# Patient Record
Sex: MALE | Race: WHITE | URBAN - METROPOLITAN AREA
[De-identification: names, ages, dates, MRNs, and addresses within clinical notes are randomized per-mention and may not be internally consistent; named-entity substitution may affect disease eponyms.]

---

## 2017-03-08 ENCOUNTER — OFFICE VISIT (OUTPATIENT)
Dept: PEDIATRICS | Facility: CLINIC | Age: 47
End: 2017-03-08
Payer: COMMERCIAL

## 2017-03-08 ENCOUNTER — RADIANT APPOINTMENT (OUTPATIENT)
Dept: CT IMAGING | Facility: CLINIC | Age: 47
End: 2017-03-08
Attending: INTERNAL MEDICINE
Payer: COMMERCIAL

## 2017-03-08 VITALS
HEART RATE: 83 BPM | OXYGEN SATURATION: 98 % | TEMPERATURE: 96.3 F | BODY MASS INDEX: 32.23 KG/M2 | WEIGHT: 246 LBS | SYSTOLIC BLOOD PRESSURE: 146 MMHG | DIASTOLIC BLOOD PRESSURE: 100 MMHG

## 2017-03-08 DIAGNOSIS — R31.29 MICROSCOPIC HEMATURIA: ICD-10-CM

## 2017-03-08 DIAGNOSIS — R10.11 ABDOMINAL PAIN, RIGHT UPPER QUADRANT: Primary | ICD-10-CM

## 2017-03-08 DIAGNOSIS — K76.0 HEPATIC STEATOSIS: ICD-10-CM

## 2017-03-08 DIAGNOSIS — R19.7 DIARRHEA, UNSPECIFIED TYPE: ICD-10-CM

## 2017-03-08 DIAGNOSIS — R94.5 ABNORMAL RESULTS OF LIVER FUNCTION STUDIES: ICD-10-CM

## 2017-03-08 DIAGNOSIS — R10.11 ABDOMINAL PAIN, RIGHT UPPER QUADRANT: ICD-10-CM

## 2017-03-08 DIAGNOSIS — R03.0 ELEVATED BLOOD-PRESSURE READING WITHOUT DIAGNOSIS OF HYPERTENSION: ICD-10-CM

## 2017-03-08 LAB
ALBUMIN SERPL-MCNC: 3.5 G/DL (ref 3.4–5)
ALBUMIN UR-MCNC: NEGATIVE MG/DL
ALP SERPL-CCNC: 91 U/L (ref 40–150)
ALT SERPL W P-5'-P-CCNC: 74 U/L (ref 0–70)
ANION GAP SERPL CALCULATED.3IONS-SCNC: 6 MMOL/L (ref 3–14)
APPEARANCE UR: CLEAR
AST SERPL W P-5'-P-CCNC: 64 U/L (ref 0–45)
BASOPHILS # BLD AUTO: 0 10E9/L (ref 0–0.2)
BASOPHILS NFR BLD AUTO: 0.6 %
BILIRUB SERPL-MCNC: 0.7 MG/DL (ref 0.2–1.3)
BILIRUB UR QL STRIP: NEGATIVE
BUN SERPL-MCNC: 12 MG/DL (ref 7–30)
CALCIUM SERPL-MCNC: 8.4 MG/DL (ref 8.5–10.1)
CHLORIDE SERPL-SCNC: 104 MMOL/L (ref 94–109)
CO2 SERPL-SCNC: 26 MMOL/L (ref 20–32)
COLOR UR AUTO: YELLOW
CREAT SERPL-MCNC: 0.82 MG/DL (ref 0.66–1.25)
DIFFERENTIAL METHOD BLD: NORMAL
EOSINOPHIL # BLD AUTO: 0.1 10E9/L (ref 0–0.7)
EOSINOPHIL NFR BLD AUTO: 2.9 %
ERYTHROCYTE [DISTWIDTH] IN BLOOD BY AUTOMATED COUNT: 13.4 % (ref 10–15)
GFR SERPL CREATININE-BSD FRML MDRD: ABNORMAL ML/MIN/1.7M2
GLUCOSE SERPL-MCNC: 98 MG/DL (ref 70–99)
GLUCOSE UR STRIP-MCNC: NEGATIVE MG/DL
HCT VFR BLD AUTO: 42.5 % (ref 40–53)
HGB BLD-MCNC: 14.3 G/DL (ref 13.3–17.7)
HGB UR QL STRIP: ABNORMAL
KETONES UR STRIP-MCNC: NEGATIVE MG/DL
LEUKOCYTE ESTERASE UR QL STRIP: NEGATIVE
LYMPHOCYTES # BLD AUTO: 1.6 10E9/L (ref 0.8–5.3)
LYMPHOCYTES NFR BLD AUTO: 33.5 %
MCH RBC QN AUTO: 31 PG (ref 26.5–33)
MCHC RBC AUTO-ENTMCNC: 33.6 G/DL (ref 31.5–36.5)
MCV RBC AUTO: 92 FL (ref 78–100)
MONOCYTES # BLD AUTO: 0.5 10E9/L (ref 0–1.3)
MONOCYTES NFR BLD AUTO: 10.7 %
NEUTROPHILS # BLD AUTO: 2.6 10E9/L (ref 1.6–8.3)
NEUTROPHILS NFR BLD AUTO: 52.3 %
NITRATE UR QL: NEGATIVE
PH UR STRIP: 7 PH (ref 5–7)
PLATELET # BLD AUTO: 182 10E9/L (ref 150–450)
POTASSIUM SERPL-SCNC: 4.1 MMOL/L (ref 3.4–5.3)
PROT SERPL-MCNC: 7 G/DL (ref 6.8–8.8)
RBC # BLD AUTO: 4.61 10E12/L (ref 4.4–5.9)
RBC #/AREA URNS AUTO: ABNORMAL /HPF (ref 0–2)
SODIUM SERPL-SCNC: 136 MMOL/L (ref 133–144)
SP GR UR STRIP: 1.04 (ref 1–1.03)
URN SPEC COLLECT METH UR: ABNORMAL
UROBILINOGEN UR STRIP-MCNC: NORMAL MG/DL (ref 0–2)
WBC # BLD AUTO: 4.9 10E9/L (ref 4–11)
WBC #/AREA URNS AUTO: ABNORMAL /HPF (ref 0–2)

## 2017-03-08 PROCEDURE — 81001 URINALYSIS AUTO W/SCOPE: CPT | Performed by: INTERNAL MEDICINE

## 2017-03-08 PROCEDURE — 85025 COMPLETE CBC W/AUTO DIFF WBC: CPT | Performed by: INTERNAL MEDICINE

## 2017-03-08 PROCEDURE — 80053 COMPREHEN METABOLIC PANEL: CPT | Performed by: INTERNAL MEDICINE

## 2017-03-08 PROCEDURE — 36415 COLL VENOUS BLD VENIPUNCTURE: CPT | Performed by: INTERNAL MEDICINE

## 2017-03-08 PROCEDURE — 74177 CT ABD & PELVIS W/CONTRAST: CPT | Performed by: RADIOLOGY

## 2017-03-08 PROCEDURE — 99214 OFFICE O/P EST MOD 30 MIN: CPT | Performed by: INTERNAL MEDICINE

## 2017-03-08 RX ORDER — LOPERAMIDE HCL 2 MG
2 CAPSULE ORAL 4 TIMES DAILY PRN
COMMUNITY
End: 2017-11-14

## 2017-03-08 RX ORDER — IOPAMIDOL 755 MG/ML
135 INJECTION, SOLUTION INTRAVASCULAR ONCE
Status: COMPLETED | OUTPATIENT
Start: 2017-03-08 | End: 2017-03-08

## 2017-03-08 RX ADMIN — IOPAMIDOL 135 ML: 755 INJECTION, SOLUTION INTRAVASCULAR at 15:16

## 2017-03-08 NOTE — PATIENT INSTRUCTIONS
Treating Diarrhea  Diarrhea occurs when you have loose, watery, or frequent bowel movements. It is a common problem with many causes. Most cases of diarrhea clear up on their own. But certain cases may need treatment. Be sure to see your health care provider if your symptoms do not improve within a few days.    Getting Relief  Treatment of diarrhea depends on its cause. Diarrhea caused by bacterial or parasite infection is often treated with antibiotics. Diarrhea caused by other factors, such as a stomach virus, often improves with simple home treatment. The tips below may also help relieve your symptoms.    Drink plenty of fluids. This helps prevent too much fluid loss (dehydration). Water, clear soups, and electrolyte solutions are good choices. Avoid alcohol, coffee, tea, and milk. These can make symptoms worse.    Suck on ice chips if drinking makes you queasy.    Return to your normal diet slowly. You may want to eat bland foods at first, such as rice and toast. Also, you may need to avoid certain foods for a while, such as dairy products. These can make symptoms worse. Ask your health care provider if there are any other foods you should avoid.    If you were prescribed antibiotics, take them as directed.    Do not take anti-diarrhea medications without asking your health care provider first.  Call Your Health Care Provider If You Have:    Fever of 102 F (38.0 C) or higher    Severe pain    Worsening diarrhea or diarrhea for more than 2 days    Bloody vomit or stool    Signs of dehydration (dizziness, dry mouth and tongue, rapid pulse, dark urine)    3624-3111 [employer]. [address] [place] [address], [place] [address]. All rights reserved. This information is not intended as a substitute for professional medical care. Always follow your healthcare professional's instructions.        Uncertain Causes of Diarrhea (Adult)    Diarrhea is when stools are loose and watery. This can be caused by:    Viral  infections    Bacterial infections    Food poisoning    Parasites    Irritable bowel syndrome (IBS)    Inflammatory bowel diseases such as ulcerative colitis, Crohn's disease, and celiac disease    Food intolerance, such as to lactose, the sugar found in milk and milk products    Reaction to medicines like antibiotics, laxatives, cancer drugs, and antacids  Along with diarrhea, you may also have:    Abdominal pain and cramping    Nausea and vomiting    Loss of bowel control    Fever and chills    Bloody stools  In some cases, antibiotics may help to treat diarrhea. You may have a stool sample test. This is done to see what is causing your diarrhea, and if antibiotics will help treat it. The results of a stool sample test may take up to 2 days. The healthcare provider may not give you antibiotics until he or she has the stool test results.  Diarrhea can cause dehydration. This is the loss of too much water and other fluids from the body. When this occurs, body fluid must be replaced. This can be done with oral rehydration solutions. Oral rehydration solutions are available at drugstores and grocery stores without a prescription.  Home care  Follow all instructions given by your healthcare provider. Rest at home for the next 24 hours, or until you feel better. Avoid caffeine, tobacco, and alcohol. These can make diarrhea, cramping, and pain worse.  If taking medicines:    Don t take over-the-counter diarrhea or nausea medicines unless your healthcare provider tells you to.    You may use acetaminophen or NSAID medicines like ibuprofen or naproxen to reduce pain and fever. Don t use these if you have chronic liver or kidney disease, or ever had a stomach ulcer or gastrointestinal bleeding. Don't use NSAID medicines if you are already taking one for another condition (like arthritis) or are on daily aspirin therapy (such as for heart disease or after a stroke). Talk with your healthcare provider first.    If antibiotics  were prescribed, be sure you take them until they are finished. Don t stop taking them even when you feel better. Antibiotics must be taken as a full course.  To prevent the spread of illness:    Remember that washing with soap and water and using alcohol-based  is the best way to prevent the spread of infection.    Clean the toilet after each use.    Wash your hands before eating.    Wash your hands before and after preparing food. Keep in mind that people with diarrhea or vomiting should not prepare food for others.    Wash your hands after using cutting boards, countertops, and knives that have been in contact with raw foods.    Wash and then peel fruits and vegetables.    Keep uncooked meats away from cooked and ready-to-eat foods.    Use a food thermometer when cooking. Cook poultry to at least 165 F (74 C). Cook ground meat (beef, veal, pork, lamb) to at least 160 F (71 C). Cook fresh beef, veal, lamb, and pork to at least 145 F (63 C).    Don t eat raw or undercooked eggs (poached or precious side up), poultry, meat, or unpasteurized milk and juices.  Food and drinks  The main goal while treating vomiting or diarrhea is to prevent dehydration. This is done by taking small amounts of liquids often.    Keep in mind that liquids are more important than food right now.    Drink only small amounts of liquids at a time.    Don t force yourself to eat, especially if you are having cramping, vomiting, or diarrhea. Don t eat large amounts at a time, even if you are hungry.    If you eat, avoid fatty, greasy, spicy, or fried foods.    Don t eat dairy foods or drink milk if you have diarrhea. These can make diarrhea worse.  During the first 24 hours you can try:    Oral rehydration solutions. Do not use sports drinks. They have too much sugar and not enough electrolytes.    Soft drinks without caffeine    Ginger ale    Water (plain or flavored)    Decaf tea or coffee    Clear broth, consommé, or  bouillon    Gelatin, popsicles, or frozen fruit juice bars  The second 24 hours, if you are feeling better, you can add:    Hot cereal, plain toast, bread, rolls, or crackers    Plain noodles, rice, mashed potatoes, chicken noodle soup, or rice soup    Unsweetened canned fruit (no pineapple)    Bananas  As you recover:    Limit fat intake to less than 15 grams per day. Don t eat margarine, butter, oils, mayonnaise, sauces, gravies, fried foods, peanut butter, meat, poultry, or fish.    Limit fiber. Don t eat raw or cooked vegetables, fresh fruits except bananas, or bran cereals.    Limit caffeine and chocolate.    Limit dairy.    Don t use spices or seasonings except salt.    Go back to your normal diet over time, as you feel better and your symptoms improve.    If the symptoms come back, go back to a simple diet or clear liquids.  Follow-up care  Follow up with your healthcare provider, or as advised. If a stool sample was taken or cultures were done, call the healthcare provider for the results as instructed.  Call 911  Call 911 if you have any of these symptoms:    Trouble breathing    Confusion    Extreme drowsiness or trouble walking    Loss of consciousness    Rapid heart rate    Chest pain    Stiff neck    Seizure  When to seek medical advice  Call your healthcare provider right away if any of these occur:    Abdominal pain that gets worse    Constant lower right abdominal pain    Continued vomiting and inability to keep liquids down    Diarrhea more than 5 times a day    Blood in vomit or stool    Dark urine or no urine for 8 hours, dry mouth and tongue, tiredness, weakness, or dizziness    Drowsiness    New rash    You don t get better in 2 to 3 days    Fever of 100.4 F (38 C) or higher that doesn t get lower with medicine    9738-5737 [employer]. [address] [place] [address], [place] [address]. All rights reserved. This information is not intended as a substitute for professional medical care.  Always follow your healthcare professional's instructions.

## 2017-03-08 NOTE — PROGRESS NOTES
SUBJECTIVE:                                                    Derrick Espinosa is a 46 year old male who presents to clinic today for the following health issues:      Abdominal Pain      Duration: 2 mos, worse past 2 weeks    Description (location/character/radiation): Dull ache, cramping in low abd, right abd, and left abd. Pain moves around       Associated flank pain: None    Intensity:  moderate    Accompanying signs and symptoms:        Fever/Chills: no        Gas/Bloating: YES       Nausea/vomitting: no        Diarrhea: YES       Dysuria or Hematuria: no     History (previous similar pain/trauma/previous testing): none    Precipitating or alleviating factors:       Pain worse with eating/BM/urination: eating sets it off       Pain relieved by BM: YES temporaly    Therapies tried and outcome: imodium         HPI: 4 month history of episodic diarrhea associated with cramping abdominal pain. Usually abdominal pain is in the lower quadrant but today's in the right upper quadrant. Had similar pain about a year ago which resolved after patient stopped alcohol. Does admit now to occasional use of alcohol.     PMH: Cholecystectomy  Current Outpatient Prescriptions   Medication Sig Dispense Refill     loperamide (IMODIUM) 2 MG capsule Take 2 mg by mouth 4 times daily as needed for diarrhea       ibuprofen (ADVIL,MOTRIN) 200 MG tablet Take 2 tablets by mouth every 4 hours as needed.       No Known Allergies  No lab results found.     ROS:  C: NEGATIVE for fever, chills, change in weight  E/M: NEGATIVE for ear, mouth and throat problems  R: NEGATIVE for significant cough or SOB  CV: NEGATIVE for chest pain, palpitations or peripheral edema  GI: POSITIVE for abdominal pain lower abdominal yesterday and RUQ today.  : negative for dysuria, hematuria  NEURO: NEGATIVE for weakness, dizziness or paresthesias  PSYCHIATRIC: started on zolfot a year ago but stopped    OBJECTIVE:                                                     BP (!) 146/100  Pulse 83  Temp 96.3  F (35.7  C) (Temporal)  Wt 246 lb (111.6 kg)  SpO2 98%  BMI 32.23 kg/m2  Body mass index is 32.23 kg/(m^2).     GENERAL: healthy, alert and no distress  HENT: normal cephalic/atraumatic, nose and mouth without ulcers or lesions, oropharynx clear and oral mucous membranes moist  NECK: no adenopathy, no asymmetry, masses, or scars and thyroid normal to palpation  RESP: lungs clear to auscultation - no rales, rhonchi or wheezes  CV: regular rates and rhythm, normal S1 S2, no S3 or S4 and no murmur, click or rub  ABDOMEN: tenderness RUQ, no organomegaly or masses and bowel sounds normal  NEURO: Normal strength and tone, mentation intact and speech normal  PSYCH: mentation appears normal, affect normal/bright    Diagnostic Test Results:  Results for orders placed or performed in visit on 03/08/17 (from the past 24 hour(s))   CBC with platelets and differential   Result Value Ref Range    WBC 4.9 4.0 - 11.0 10e9/L    RBC Count 4.61 4.4 - 5.9 10e12/L    Hemoglobin 14.3 13.3 - 17.7 g/dL    Hematocrit 42.5 40.0 - 53.0 %    MCV 92 78 - 100 fl    MCH 31.0 26.5 - 33.0 pg    MCHC 33.6 31.5 - 36.5 g/dL    RDW 13.4 10.0 - 15.0 %    Platelet Count 182 150 - 450 10e9/L    Diff Method Automated Method     % Neutrophils 52.3 %    % Lymphocytes 33.5 %    % Monocytes 10.7 %    % Eosinophils 2.9 %    % Basophils 0.6 %    Absolute Neutrophil 2.6 1.6 - 8.3 10e9/L    Absolute Lymphocytes 1.6 0.8 - 5.3 10e9/L    Absolute Monocytes 0.5 0.0 - 1.3 10e9/L    Absolute Eosinophils 0.1 0.0 - 0.7 10e9/L    Absolute Basophils 0.0 0.0 - 0.2 10e9/L   Comprehensive metabolic panel (BMP + Alb, Alk Phos, ALT, AST, Total. Bili, TP)   Result Value Ref Range    Sodium 136 133 - 144 mmol/L    Potassium 4.1 3.4 - 5.3 mmol/L    Chloride 104 94 - 109 mmol/L    Carbon Dioxide 26 20 - 32 mmol/L    Anion Gap 6 3 - 14 mmol/L    Glucose 98 70 - 99 mg/dL    Urea Nitrogen 12 7 - 30 mg/dL    Creatinine 0.82 0.66 - 1.25 mg/dL    GFR  Estimate >90  Non  GFR Calc   >60 mL/min/1.7m2    GFR Estimate If Black >90   GFR Calc   >60 mL/min/1.7m2    Calcium 8.4 (L) 8.5 - 10.1 mg/dL    Bilirubin Total 0.7 0.2 - 1.3 mg/dL    Albumin 3.5 3.4 - 5.0 g/dL    Protein Total 7.0 6.8 - 8.8 g/dL    Alkaline Phosphatase 91 40 - 150 U/L    ALT 74 (H) 0 - 70 U/L    AST 64 (H) 0 - 45 U/L   UA reflex to Microscopic ([place])   Result Value Ref Range    Color Urine Yellow     Appearance Urine Clear     Glucose Urine Negative NEG mg/dL    Bilirubin Urine Negative NEG    Ketones Urine Negative NEG mg/dL    Specific Gravity Urine 1.040 (H) 1.003 - 1.035    Blood Urine Trace (A) NEG    pH Urine 7.0 5.0 - 7.0 pH    Protein Albumin Urine Negative NEG mg/dL    Urobilinogen mg/dL Normal 0.0 - 2.0 mg/dL    Nitrite Urine Negative NEG    Leukocyte Esterase Urine Negative NEG    Source Midstream Urine    Urine Microscopic   Result Value Ref Range    WBC Urine O - 2 0 - 2 /HPF    RBC Urine 2-5 (A) 0 - 2 /HPF        ASSESSMENT/PLAN:                                                    1. Abdominal pain, right upper quadrant    Etiology is not clear after initial testing I will recheck tomorrow    - CBC with platelets and differential  - UA reflex to Microscopic ([place])  - Comprehensive metabolic panel (BMP + Alb, Alk Phos, ALT, AST, Total. Bili, TP)  - CT Abdomen Pelvis w Contrast; Future  - Urine Microscopic    2. Diarrhea, unspecified type  Trial of Imodium overnight and reevaluate tomorrow      3. Hepatic steatosis  4. Abnormal results of liver function studies    Possible alcoholic liver disease    5. Microscopic hematuria  I don't think this is related to his abdominal pain but will evaluate    6. Elevated blood-pressure reading without diagnosis of hypertension  Recheck tomorrow          Will call or return to clinic if worsening or symptoms not improving as discussed.  See Patient Instructions      Nathanael Hamlin MD  [facility]

## 2017-03-08 NOTE — NURSING NOTE
"Chief Complaint   Patient presents with     Abdominal Pain       Initial BP (!) 146/100  Pulse 83  Temp 96.3  F (35.7  C) (Temporal)  Wt 246 lb (111.6 kg)  SpO2 98%  BMI 32.23 kg/m2 Estimated body mass index is 32.23 kg/(m^2) as calculated from the following:    Height as of 2/12/16: 6' 1.25\" (1.861 m).    Weight as of this encounter: 246 lb (111.6 kg).  Medication Reconciliation: complete    "

## 2017-03-08 NOTE — MR AVS SNAPSHOT
After Visit Summary   3/8/2017    Derrick Espinosa    MRN: [id number]           Patient Information     Date Of Birth          1970        Visit Information        Provider Department      3/8/2017 1:40 PM Nathanael Hamlin MD [facility]        Today's Diagnoses     Abdominal pain, right upper quadrant    -  1    Elevated blood-pressure reading without diagnosis of hypertension          Care Instructions      Treating Diarrhea  Diarrhea occurs when you have loose, watery, or frequent bowel movements. It is a common problem with many causes. Most cases of diarrhea clear up on their own. But certain cases may need treatment. Be sure to see your health care provider if your symptoms do not improve within a few days.    Getting Relief  Treatment of diarrhea depends on its cause. Diarrhea caused by bacterial or parasite infection is often treated with antibiotics. Diarrhea caused by other factors, such as a stomach virus, often improves with simple home treatment. The tips below may also help relieve your symptoms.    Drink plenty of fluids. This helps prevent too much fluid loss (dehydration). Water, clear soups, and electrolyte solutions are good choices. Avoid alcohol, coffee, tea, and milk. These can make symptoms worse.    Suck on ice chips if drinking makes you queasy.    Return to your normal diet slowly. You may want to eat bland foods at first, such as rice and toast. Also, you may need to avoid certain foods for a while, such as dairy products. These can make symptoms worse. Ask your health care provider if there are any other foods you should avoid.    If you were prescribed antibiotics, take them as directed.    Do not take anti-diarrhea medications without asking your health care provider first.  Call Your Health Care Provider If You Have:    Fever of 102 F (38.0 C) or higher    Severe pain    Worsening diarrhea or diarrhea for more than 2 days    Bloody vomit or  stool    Signs of dehydration (dizziness, dry mouth and tongue, rapid pulse, dark urine)    3239-6883 [employer]. [address] [place] [address], [place] [address]. All rights reserved. This information is not intended as a substitute for professional medical care. Always follow your healthcare professional's instructions.        Uncertain Causes of Diarrhea (Adult)    Diarrhea is when stools are loose and watery. This can be caused by:    Viral infections    Bacterial infections    Food poisoning    Parasites    Irritable bowel syndrome (IBS)    Inflammatory bowel diseases such as ulcerative colitis, Crohn's disease, and celiac disease    Food intolerance, such as to lactose, the sugar found in milk and milk products    Reaction to medicines like antibiotics, laxatives, cancer drugs, and antacids  Along with diarrhea, you may also have:    Abdominal pain and cramping    Nausea and vomiting    Loss of bowel control    Fever and chills    Bloody stools  In some cases, antibiotics may help to treat diarrhea. You may have a stool sample test. This is done to see what is causing your diarrhea, and if antibiotics will help treat it. The results of a stool sample test may take up to 2 days. The healthcare provider may not give you antibiotics until he or she has the stool test results.  Diarrhea can cause dehydration. This is the loss of too much water and other fluids from the body. When this occurs, body fluid must be replaced. This can be done with oral rehydration solutions. Oral rehydration solutions are available at drugstores and grocery stores without a prescription.  Home care  Follow all instructions given by your healthcare provider. Rest at home for the next 24 hours, or until you feel better. Avoid caffeine, tobacco, and alcohol. These can make diarrhea, cramping, and pain worse.  If taking medicines:    Don t take over-the-counter diarrhea or nausea medicines unless your healthcare provider tells you  to.    You may use acetaminophen or NSAID medicines like ibuprofen or naproxen to reduce pain and fever. Don t use these if you have chronic liver or kidney disease, or ever had a stomach ulcer or gastrointestinal bleeding. Don't use NSAID medicines if you are already taking one for another condition (like arthritis) or are on daily aspirin therapy (such as for heart disease or after a stroke). Talk with your healthcare provider first.    If antibiotics were prescribed, be sure you take them until they are finished. Don t stop taking them even when you feel better. Antibiotics must be taken as a full course.  To prevent the spread of illness:    Remember that washing with soap and water and using alcohol-based  is the best way to prevent the spread of infection.    Clean the toilet after each use.    Wash your hands before eating.    Wash your hands before and after preparing food. Keep in mind that people with diarrhea or vomiting should not prepare food for others.    Wash your hands after using cutting boards, countertops, and knives that have been in contact with raw foods.    Wash and then peel fruits and vegetables.    Keep uncooked meats away from cooked and ready-to-eat foods.    Use a food thermometer when cooking. Cook poultry to at least 165 F (74 C). Cook ground meat (beef, veal, pork, lamb) to at least 160 F (71 C). Cook fresh beef, veal, lamb, and pork to at least 145 F (63 C).    Don t eat raw or undercooked eggs (poached or precious side up), poultry, meat, or unpasteurized milk and juices.  Food and drinks  The main goal while treating vomiting or diarrhea is to prevent dehydration. This is done by taking small amounts of liquids often.    Keep in mind that liquids are more important than food right now.    Drink only small amounts of liquids at a time.    Don t force yourself to eat, especially if you are having cramping, vomiting, or diarrhea. Don t eat large amounts at a time, even if you  are hungry.    If you eat, avoid fatty, greasy, spicy, or fried foods.    Don t eat dairy foods or drink milk if you have diarrhea. These can make diarrhea worse.  During the first 24 hours you can try:    Oral rehydration solutions. Do not use sports drinks. They have too much sugar and not enough electrolytes.    Soft drinks without caffeine    Ginger ale    Water (plain or flavored)    Decaf tea or coffee    Clear broth, consommé, or bouillon    Gelatin, popsicles, or frozen fruit juice bars  The second 24 hours, if you are feeling better, you can add:    Hot cereal, plain toast, bread, rolls, or crackers    Plain noodles, rice, mashed potatoes, chicken noodle soup, or rice soup    Unsweetened canned fruit (no pineapple)    Bananas  As you recover:    Limit fat intake to less than 15 grams per day. Don t eat margarine, butter, oils, mayonnaise, sauces, gravies, fried foods, peanut butter, meat, poultry, or fish.    Limit fiber. Don t eat raw or cooked vegetables, fresh fruits except bananas, or bran cereals.    Limit caffeine and chocolate.    Limit dairy.    Don t use spices or seasonings except salt.    Go back to your normal diet over time, as you feel better and your symptoms improve.    If the symptoms come back, go back to a simple diet or clear liquids.  Follow-up care  Follow up with your healthcare provider, or as advised. If a stool sample was taken or cultures were done, call the healthcare provider for the results as instructed.  Call 911  Call 911 if you have any of these symptoms:    Trouble breathing    Confusion    Extreme drowsiness or trouble walking    Loss of consciousness    Rapid heart rate    Chest pain    Stiff neck    Seizure  When to seek medical advice  Call your healthcare provider right away if any of these occur:    Abdominal pain that gets worse    Constant lower right abdominal pain    Continued vomiting and inability to keep liquids down    Diarrhea more than 5 times a  day    Blood in vomit or stool    Dark urine or no urine for 8 hours, dry mouth and tongue, tiredness, weakness, or dizziness    Drowsiness    New rash    You don t get better in 2 to 3 days    Fever of 100.4 F (38 C) or higher that doesn t get lower with medicine    3897-0178 [employer]. [address] [place] [address]. All rights reserved. This information is not intended as a substitute for professional medical care. Always follow your healthcare professional's instructions.              Follow-ups after your visit        Follow-up notes from your care team     Return in about 1 year (around 3/8/2018) for With lab and xray results.      Future tests that were ordered for you today     Open Future Orders        Priority Expected Expires Ordered    CT Abdomen Pelvis w Contrast Routine 3/9/2017 3/8/2018 3/8/2017            Who to contact     If you have questions or need follow up information about today's clinic visit or your schedule please contact [facility] directly at [phone].  Normal or non-critical lab and imaging results will be communicated to you by beprettyhart, letter or phone within 4 business days after the clinic has received the results. If you do not hear from us within 7 days, please contact the clinic through [employer] or phone. If you have a critical or abnormal lab result, we will notify you by phone as soon as possible.  Submit refill requests through [employer] or call your pharmacy and they will forward the refill request to us. Please allow 3 business days for your refill to be completed.          Additional Information About Your Visit        [employer] Information     [employer] gives you secure access to your electronic health record. If you see a primary care provider, you can also send messages to your care team and make appointments. If you have questions, please call your primary care clinic.  If you do not have a primary care provider, please call  [phone] and they will assist you.      [employer] is an electronic gateway that provides easy, online access to your medical records. With [employer], you can request a clinic appointment, read your test results, renew a prescription or communicate with your care team.     To access your existing account, please contact your [facility] or call [phone] for assistance.        Care EveryWhere ID     This is your Care EveryWhere ID. This could be used by other organizations to access your [facility] medical records  [id number]        Your Vitals Were     Pulse Temperature Pulse Oximetry BMI (Body Mass Index)          83 96.3  F (35.7  C) (Temporal) 98% 32.23 kg/m2         Blood Pressure from Last 3 Encounters:   03/08/17 (!) 146/100   02/12/16 122/90   10/03/13 120/70    Weight from Last 3 Encounters:   03/08/17 246 lb (111.6 kg)   02/12/16 227 lb 14.4 oz (103.4 kg)   10/03/13 209 lb 11.2 oz (95.1 kg)              We Performed the Following     CBC with platelets and differential     Comprehensive metabolic panel (BMP + Alb, Alk Phos, ALT, AST, Total. Bili, TP)     UA reflex to [facility] ([place])        Primary Care Provider Office Phone # Fax #    Elma Archuleta PA-C [phone]       XXX RESIGNED XXX [address]  [facility] [address]        Thank you!     Thank you for choosing [facility]  for your care. Our goal is always to provide you with excellent care. Hearing back from our patients is one way we can continue to improve our services. Please take a few minutes to complete the written survey that you may receive in the mail after your visit with us. Thank you!             Your Updated Medication List - Protect others around you: Learn how to safely use, store and throw away your medicines at www.disposemymeds.org.          This list is accurate as of: 3/8/17  2:23 PM.  Always use your most recent med list.                    Brand Name Dispense Instructions for use    ibuprofen 200 MG tablet    ADVIL/MOTRIN     Take 2 tablets by mouth every 4 hours as needed.       loperamide 2 MG capsule    IMODIUM     Take 2 mg by mouth 4 times daily as needed for diarrhea

## 2017-03-09 ENCOUNTER — OFFICE VISIT (OUTPATIENT)
Dept: PEDIATRICS | Facility: CLINIC | Age: 47
End: 2017-03-09
Payer: COMMERCIAL

## 2017-03-09 VITALS
HEIGHT: 73 IN | DIASTOLIC BLOOD PRESSURE: 99 MMHG | SYSTOLIC BLOOD PRESSURE: 143 MMHG | HEART RATE: 90 BPM | OXYGEN SATURATION: 95 % | WEIGHT: 240.7 LBS | BODY MASS INDEX: 31.9 KG/M2 | TEMPERATURE: 97.8 F

## 2017-03-09 DIAGNOSIS — K76.0 FATTY LIVER: ICD-10-CM

## 2017-03-09 DIAGNOSIS — R03.0 ELEVATED BLOOD PRESSURE READING WITHOUT DIAGNOSIS OF HYPERTENSION: ICD-10-CM

## 2017-03-09 DIAGNOSIS — R31.29 MICROSCOPIC HEMATURIA: ICD-10-CM

## 2017-03-09 DIAGNOSIS — K58.0 IRRITABLE BOWEL SYNDROME WITH DIARRHEA: Primary | ICD-10-CM

## 2017-03-09 PROCEDURE — 99212 OFFICE O/P EST SF 10 MIN: CPT | Performed by: INTERNAL MEDICINE

## 2017-03-09 NOTE — NURSING NOTE
"Chief Complaint   Patient presents with     Results     CT results       Initial BP (!) 143/99 (BP Location: Right arm, Patient Position: Chair, Cuff Size: Adult Large)  Pulse 90  Temp 97.8  F (36.6  C) (Tympanic)  Ht 6' 1.25\" (1.861 m)  Wt 240 lb 11.2 oz (109.2 kg)  SpO2 95%  BMI 31.54 kg/m2 Estimated body mass index is 31.54 kg/(m^2) as calculated from the following:    Height as of this encounter: 6' 1.25\" (1.861 m).    Weight as of this encounter: 240 lb 11.2 oz (109.2 kg).  Medication Reconciliation: complete     Beronica Gross CMA    "

## 2017-03-09 NOTE — PATIENT INSTRUCTIONS
Diet and Lifestyle Tips for IBS  Your health care professional may suggest some lifestyle changes to help control your IBS. Changing your diet and managing stress are two of the most important. Follow your doctor s instructions and try some of the suggestions below.    Change Your Diet  Your diet may be an important cause of IBS symptoms. You may want to try the following:    Pay attention to what foods bother you, and avoid them. For example, dairy products are hard for some people to digest.    Drink 6 to 8 glasses of water a day.    Avoid caffeine and tobacco. These are muscle stimulants and can affect the working of your digestive tract.    Avoid alcohol, which can irritate your digestive tract and make your symptoms worse.    Eat more fiber if constipation is a problem. Fiber makes the stool softer and easier to pass through the colon.  Reduce Stress  If stress or anxiety contributes to your IBS, learning how to manage stress may help you feel better. Try these tips:    Identify the causes of stress in your life.    Learn new ways to cope with them.    Regular exercise is a great way to relieve stress. It can also help ease constipation.    7347-9409 [employer]. [address] [place] [address]. All rights reserved. This information is not intended as a substitute for professional medical care. Always follow your healthcare professional's instructions.        What Is High Blood Pressure?  High blood pressure (also called hypertension) is known as the  silent killer.  This is because most of the time it doesn t cause symptoms. In fact, many people don t know they have it until other problems develop. In most cases, high blood pressure can t be cured. It s a disease that requires lifelong treatment. The good news is that it CAN be managed.  Understanding blood pressure  The circulatory system is made up of the heart and blood vessels that carry blood through the body. Your heart is the  pump for this system. With each heartbeat (contraction), the heart sends blood out through large blood vessels called arteries. Blood pressure is a measure of how hard the moving blood pushes against the walls of the arteries.          High blood pressure can harm your health  High blood pressure makes the heart work harder to pump blood. Frequent high blood pressure can also cause changes in the artery walls. The walls thicken and become rough, which leads to a buildup of plaque (a fatty material). This can damage the arteries. It can also reduce blood flow through the artery. If blood pressure is not controlled, all these effects can lead to serious health problems. These include heart disease, heart attack (also known as acute myocardial infarction, or AMI), stroke, kidney disease, and blindness.  Measuring blood pressure  An example of a blood pressure measurement is 120/70 (120 over 70). The top number is the pressure of blood against the artery walls during a heartbeat (systolic). The bottom number is the pressure of blood against artery walls between heartbeats (diastolic). Talk with your health care provider to find out what your blood pressure goals should be.   Controlling blood pressure  If your blood pressure is too high, work with your doctor on a plan for lowering it. Below are steps you can take that will help lower your blood pressure.    Choose heart-healthy foods. Eating healthier meals helps you control your blood pressure. Ask your doctor about the DASH eating plan. This plan helps reduce blood pressure by limiting the amount of sodium (salt) you have in your diet.     Maintain a healthy weight. Being overweight makes you more likely to have high blood pressure. Losing excess weight helps lower blood pressure.    Exercise regularly. Daily exercise helps your heart and blood vessels work better and stay healthier. It can help lower your blood pressure.    Stop smoking. Smoking increases blood  pressure and damages blood vessels.    Limit alcohol. Drinking too much alcohol can raise blood pressure. Men should have no more than 2 drinks a day. Women should have no more than 1. (A drink is equal to 1 beer, or a small glass of wine, or a shot of liquor.)    Control stress. Stress makes your heart work harder and beat faster. Controlling stress helps you control your blood pressure.  Facts about high blood pressure    Feeling OK does not mean that blood pressure is under control. Likewise, feeling bad doesn t mean it s out of control. The only way to know for sure is to check your pressure regularly.    Medication is only one part of controlling high blood pressure. You also need to manage your weight, get regular exercise, and adjust your eating habits.    High blood pressure is usually a lifelong problem. But it can be controlled with healthy lifestyle changes and medication.    Hypertension is not the same as stress. Although stress may be a factor in high blood pressure, it s only one part of the story.    Blood pressure medications need to be taken every day. Stopping suddenly may cause a dangerous increase in pressure.     2304-7682 [employer]. [address] [place] [address], [place] [address]. All rights reserved. This information is not intended as a substitute for professional medical care. Always follow your healthcare professional's instructions.

## 2017-03-09 NOTE — MR AVS SNAPSHOT
After Visit Summary   3/9/2017    Derrick Espinosa    MRN: [id number]           Patient Information     Date Of Birth          1970        Visit Information        Provider Department      3/9/2017 11:40 AM Nathanael Hamlin MD [facility]        Today's Diagnoses     Irritable bowel syndrome with diarrhea    -  1    Elevated blood pressure reading without diagnosis of hypertension        Microscopic hematuria        Fatty liver          Care Instructions      Diet and Lifestyle Tips for IBS  Your health care professional may suggest some lifestyle changes to help control your IBS. Changing your diet and managing stress are two of the most important. Follow your doctor s instructions and try some of the suggestions below.    Change Your Diet  Your diet may be an important cause of IBS symptoms. You may want to try the following:    Pay attention to what foods bother you, and avoid them. For example, dairy products are hard for some people to digest.    Drink 6 to 8 glasses of water a day.    Avoid caffeine and tobacco. These are muscle stimulants and can affect the working of your digestive tract.    Avoid alcohol, which can irritate your digestive tract and make your symptoms worse.    Eat more fiber if constipation is a problem. Fiber makes the stool softer and easier to pass through the colon.  Reduce Stress  If stress or anxiety contributes to your IBS, learning how to manage stress may help you feel better. Try these tips:    Identify the causes of stress in your life.    Learn new ways to cope with them.    Regular exercise is a great way to relieve stress. It can also help ease constipation.    6692-2896 [employer]. [address] [place] [address], [place] [address]. All rights reserved. This information is not intended as a substitute for professional medical care. Always follow your healthcare professional's instructions.        What Is High Blood Pressure?  High  blood pressure (also called hypertension) is known as the  silent killer.  This is because most of the time it doesn t cause symptoms. In fact, many people don t know they have it until other problems develop. In most cases, high blood pressure can t be cured. It s a disease that requires lifelong treatment. The good news is that it CAN be managed.  Understanding blood pressure  The circulatory system is made up of the heart and blood vessels that carry blood through the body. Your heart is the pump for this system. With each heartbeat (contraction), the heart sends blood out through large blood vessels called arteries. Blood pressure is a measure of how hard the moving blood pushes against the walls of the arteries.          High blood pressure can harm your health  High blood pressure makes the heart work harder to pump blood. Frequent high blood pressure can also cause changes in the artery walls. The walls thicken and become rough, which leads to a buildup of plaque (a fatty material). This can damage the arteries. It can also reduce blood flow through the artery. If blood pressure is not controlled, all these effects can lead to serious health problems. These include heart disease, heart attack (also known as acute myocardial infarction, or AMI), stroke, kidney disease, and blindness.  Measuring blood pressure  An example of a blood pressure measurement is 120/70 (120 over 70). The top number is the pressure of blood against the artery walls during a heartbeat (systolic). The bottom number is the pressure of blood against artery walls between heartbeats (diastolic). Talk with your health care provider to find out what your blood pressure goals should be.   Controlling blood pressure  If your blood pressure is too high, work with your doctor on a plan for lowering it. Below are steps you can take that will help lower your blood pressure.    Choose heart-healthy foods. Eating healthier meals helps you control  your blood pressure. Ask your doctor about the DASH eating plan. This plan helps reduce blood pressure by limiting the amount of sodium (salt) you have in your diet.     Maintain a healthy weight. Being overweight makes you more likely to have high blood pressure. Losing excess weight helps lower blood pressure.    Exercise regularly. Daily exercise helps your heart and blood vessels work better and stay healthier. It can help lower your blood pressure.    Stop smoking. Smoking increases blood pressure and damages blood vessels.    Limit alcohol. Drinking too much alcohol can raise blood pressure. Men should have no more than 2 drinks a day. Women should have no more than 1. (A drink is equal to 1 beer, or a small glass of wine, or a shot of liquor.)    Control stress. Stress makes your heart work harder and beat faster. Controlling stress helps you control your blood pressure.  Facts about high blood pressure    Feeling OK does not mean that blood pressure is under control. Likewise, feeling bad doesn t mean it s out of control. The only way to know for sure is to check your pressure regularly.    Medication is only one part of controlling high blood pressure. You also need to manage your weight, get regular exercise, and adjust your eating habits.    High blood pressure is usually a lifelong problem. But it can be controlled with healthy lifestyle changes and medication.    Hypertension is not the same as stress. Although stress may be a factor in high blood pressure, it s only one part of the story.    Blood pressure medications need to be taken every day. Stopping suddenly may cause a dangerous increase in pressure.     3160-4306 [employer]. [address] [place] [address], [place] [address]. All rights reserved. This information is not intended as a substitute for professional medical care. Always follow your healthcare professional's instructions.              Follow-ups after your visit        Follow-up  "notes from your care team     Return in about 1 month (around 4/9/2017) for Routine Visit.      Who to contact     If you have questions or need follow up information about today's clinic visit or your schedule please contact [facility] directly at [phone].  Normal or non-critical lab and imaging results will be communicated to you by [employer], letter or phone within 4 business days after the clinic has received the results. If you do not hear from us within 7 days, please contact the clinic through [employer] or phone. If you have a critical or abnormal lab result, we will notify you by phone as soon as possible.  Submit refill requests through [employer] or call your pharmacy and they will forward the refill request to us. Please allow 3 business days for your refill to be completed.          Additional Information About Your Visit        [employer]ogether Information     [employer] gives you secure access to your electronic health record. If you see a primary care provider, you can also send messages to your care team and make appointments. If you have questions, please call your primary care clinic.  If you do not have a primary care provider, please call [phone] and they will assist you.      [employer] is an electronic gateway that provides easy, online access to your medical records. With [employer], you can request a clinic appointment, read your test results, renew a prescription or communicate with your care team.     To access your existing account, please contact your [facility] or call [phone] for assistance.        Care EveryWhere ID     This is your Care EveryWhere ID. This could be used by other organizations to access your [place] medical records  [id number]        Your Vitals Were     Pulse Temperature Height Pulse Oximetry BMI (Body Mass Index)       90 97.8  F (36.6  C) (Tympanic) 6' 1.25\" (1.861 m) 95% 31.54 kg/m2        Blood Pressure from Last 3 " Encounters:   03/09/17 (!) 143/99   03/08/17 (!) 146/100   02/12/16 122/90    Weight from Last 3 Encounters:   03/09/17 240 lb 11.2 oz (109.2 kg)   03/08/17 246 lb (111.6 kg)   02/12/16 227 lb 14.4 oz (103.4 kg)              Today, you had the following     No orders found for display         Today's Medication Changes          These changes are accurate as of: 3/9/17 12:07 PM.  If you have any questions, ask your nurse or doctor.               Start taking these medicines.        Dose/Directions    hyoscyamine 0.125 MG sublingual tablet   Commonly known as:  LEVSIN/SL   Used for:  Irritable bowel syndrome with diarrhea   Started by:  Nathanael Hamlin MD        Dose:  0.125 mg   Place 1 tablet (0.125 mg) under the tongue every 4 hours as needed for cramping   Quantity:  60 tablet   Refills:  0            Where to get your medicines      These medications were sent to [facility] [address] [place] [address] AT [place] [address] [facility] [address]     Phone:  [phone]     hyoscyamine 0.125 MG sublingual tablet                Primary Care Provider Office Phone # Fax #    Elma Velvet Archuleta PA-C [phone]       XXX RESIGNED XXX [address] [facility] [address]        Thank you!     Thank you for choosing [facility]  for your care. Our goal is always to provide you with excellent care. Hearing back from our patients is one way we can continue to improve our services. Please take a few minutes to complete the written survey that you may receive in the mail after your visit with us. Thank you!             Your Updated Medication List - Protect others around you: Learn how to safely use, store and throw away your medicines at www.disposemymeds.org.          This list is accurate as of: 3/9/17 12:07 PM.  Always use your most recent med list.                   Brand Name Dispense Instructions for use    hyoscyamine  0.125 MG sublingual tablet    LEVSIN/SL    60 tablet    Place 1 tablet (0.125 mg) under the tongue every 4 hours as needed for cramping       ibuprofen 200 MG tablet    ADVIL/MOTRIN     Take 2 tablets by mouth every 4 hours as needed.       loperamide 2 MG capsule    IMODIUM     Take 2 mg by mouth 4 times daily as needed for diarrhea

## 2017-03-09 NOTE — PROGRESS NOTES
"  SUBJECTIVE:                                                    Derrick Espinosa is a 46 year old male who presents to clinic today for the following health issues:    Pt. Is present today for a f/u on a CT scan with results that was done today.    Pt. States over the last 24 hours abdominal pain has been significantly better.    HPI: Markedly improved since yesterday with no abdominal pain or diarrhea.    PMH: Cholecystectomy 20 years ago    Patient Active Problem List   Diagnosis     CARDIOVASCULAR SCREENING; LDL GOAL LESS THAN 160     NO ACTIVE PROBLEMS(aka NONE)     Alcohol abuse, episodic drinking behavior     Insomnia due to other mental disorder     Depression with anxiety     Past Surgical History   Procedure Laterality Date     Rotator cuff repair rt/lt  1988     Right     Sports hernia  2010     R side inguinal hernia       Social History   Substance Use Topics     Smoking status: Never Smoker     Smokeless tobacco: Current User     Types: Chew      Comment: current some day use of chewing tobacco     Alcohol use Yes      Comment: occasional/social     Family History   Problem Relation Age of Onset     DIABETES Mother 50     Thyroid Disease Mother      DIABETES Father 50     Hypertension Father          Current Outpatient Prescriptions   Medication Sig Dispense Refill     hyoscyamine (LEVSIN/SL) 0.125 MG sublingual tablet Place 1 tablet (0.125 mg) under the tongue every 4 hours as needed for cramping 60 tablet 0     loperamide (IMODIUM) 2 MG capsule Take 2 mg by mouth 4 times daily as needed for diarrhea       ibuprofen (ADVIL,MOTRIN) 200 MG tablet Take 2 tablets by mouth every 4 hours as needed.       No Known Allergies        OBJECTIVE:                                                    BP (!) 143/99 (BP Location: Right arm, Patient Position: Chair, Cuff Size: Adult Large)  Pulse 90  Temp 97.8  F (36.6  C) (Tympanic)  Ht 6' 1.25\" (1.861 m)  Wt 240 lb 11.2 oz (109.2 kg)  SpO2 95%  BMI 31.54 kg/m2  Body mass " index is 31.54 kg/(m^2).     GENERAL: healthy, alert and no distress  ABDOMEN: soft, nontender, no hepatosplenomegaly, no masses and bowel sounds normal    Diagnostic Test Results:  none      ASSESSMENT/PLAN:                                                    1. Irritable bowel syndrome with diarrhea    Initial workup was negative and an abdominal pain and diarrhea is not present today. Probable old diagnosis is irritable bowel syndrome discussed diet and LEVSIN/SL when necessary. Return 1 month or sooner if symptoms return    - hyoscyamine (LEVSIN/SL) 0.125 MG sublingual tablet; Place 1 tablet (0.125 mg) under the tongue every 4 hours as needed for cramping  Dispense: 60 tablet; Refill: 0    2. Elevated blood pressure reading without diagnosis of hypertension  Recheck blood pressure one month    3. Microscopic hematuria    Asymptomatic and CT was normal recheck urine in one month  - UA with Microscopic reflex to Culture ([facility]); Future    4. Fatty liver  Probably from alcohol. Patient no longer is using alcohol. Recheck LFTs 6 months to year          Will call or return to clinic if worsening or symptoms not improving as discussed.  See Patient Instructions      Nathanael Hamlin MD  [facility]

## 2017-06-08 ENCOUNTER — OFFICE VISIT (OUTPATIENT)
Dept: FAMILY MEDICINE | Facility: CLINIC | Age: 47
End: 2017-06-08
Payer: COMMERCIAL

## 2017-06-08 VITALS
WEIGHT: 249.5 LBS | RESPIRATION RATE: 12 BRPM | HEART RATE: 88 BPM | OXYGEN SATURATION: 99 % | SYSTOLIC BLOOD PRESSURE: 144 MMHG | BODY MASS INDEX: 33.07 KG/M2 | DIASTOLIC BLOOD PRESSURE: 94 MMHG | TEMPERATURE: 98.6 F | HEIGHT: 73 IN

## 2017-06-08 DIAGNOSIS — I10 HYPERTENSION GOAL BP (BLOOD PRESSURE) < 140/90: Primary | ICD-10-CM

## 2017-06-08 DIAGNOSIS — R19.7 DIARRHEA, UNSPECIFIED TYPE: ICD-10-CM

## 2017-06-08 PROCEDURE — 99214 OFFICE O/P EST MOD 30 MIN: CPT | Performed by: FAMILY MEDICINE

## 2017-06-08 NOTE — MR AVS SNAPSHOT
"              After Visit Summary   6/8/2017    Derrick Espinosa    MRN: [id number]           Patient Information     Date Of Birth          1970        Visit Information        Provider Department      6/8/2017 3:40 PM Jaja Ortez MD [facility]        Today's Diagnoses     Hypertension goal BP (blood pressure) < 140/90    -  1    Diarrhea, unspecified type           Follow-ups after your visit        Who to contact     If you have questions or need follow up information about today's clinic visit or your schedule please contact [facility] directly at [phone].  Normal or non-critical lab and imaging results will be communicated to you by MyChart, letter or phone within 4 business days after the clinic has received the results. If you do not hear from us within 7 days, please contact the clinic through [employer] or phone. If you have a critical or abnormal lab result, we will notify you by phone as soon as possible.  Submit refill requests through [employer] or call your pharmacy and they will forward the refill request to us. Please allow 3 business days for your refill to be completed.          Additional Information About Your Visit        MyChart Information     [employer] gives you secure access to your electronic health record. If you see a primary care provider, you can also send messages to your care team and make appointments. If you have questions, please call your primary care clinic.  If you do not have a primary care provider, please call [phone] and they will assist you.        Care EveryWhere ID     This is your Care EveryWhere ID. This could be used by other organizations to access your [place] medical records  [id number]        Your Vitals Were     Pulse Temperature Respirations Height Pulse Oximetry BMI (Body Mass Index)    88 98.6  F (37  C) (Oral) 12 1.861 m (6' 1.25\") 99% 32.69 kg/m2       Blood Pressure from Last 3 Encounters:   06/08/17 " (!) 144/94   03/09/17 (!) 143/99   03/08/17 (!) 146/100    Weight from Last 3 Encounters:   06/08/17 113.2 kg (249 lb 8 oz)   03/09/17 109.2 kg (240 lb 11.2 oz)   03/08/17 111.6 kg (246 lb)              Today, you had the following     No orders found for display       Primary Care Provider Office Phone #    [facility] [phone]       No address on file        Thank you!     Thank you for choosing [facility]  for your care. Our goal is always to provide you with excellent care. Hearing back from our patients is one way we can continue to improve our services. Please take a few minutes to complete the written survey that you may receive in the mail after your visit with us. Thank you!             Your Updated Medication List - Protect others around you: Learn how to safely use, store and throw away your medicines at www.disposemymeds.org.          This list is accurate as of: 6/8/17  4:11 PM.  Always use your most recent med list.                   Brand Name Dispense Instructions for use    loperamide 2 MG capsule    IMODIUM     Take 2 mg by mouth 4 times daily as needed for diarrhea

## 2017-06-08 NOTE — PROGRESS NOTES
"  SUBJECTIVE:                                                    Derrick Espinosa is a 46 year old male who presents to clinic today for the following health issues:      Follow up Blood pressure  -Pt was informed to f/u on BP   -Having some symptoms: headache, chest discomfort, and lightheadnes    Diarrhea - improved with otc medication but he is unsure of name.  Elevated BP - No specific symptom but has been trying to eat healthier, walks all day as a  but no other outside exercise.    Problem list and histories reviewed & adjusted, as indicated.  Additional history: as documented    Patient Active Problem List   Diagnosis     CARDIOVASCULAR SCREENING; LDL GOAL LESS THAN 160     NO ACTIVE PROBLEMS(aka NONE)     Alcohol abuse, episodic drinking behavior     Insomnia due to other mental disorder     Depression with anxiety     Hypertension goal BP (blood pressure) < 140/90     Diarrhea, unspecified type     Past Surgical History:   Procedure Laterality Date     ROTATOR CUFF REPAIR RT/LT  1988    Right     sports hernia  2010    R side inguinal hernia       Social History   Substance Use Topics     Smoking status: Never Smoker     Smokeless tobacco: Current User     Types: Chew      Comment: current some day use of chewing tobacco     Alcohol use Yes      Comment: occasional/social     Family History   Problem Relation Age of Onset     DIABETES Mother 50     Thyroid Disease Mother      DIABETES Father 50     Hypertension Father            Reviewed and updated as needed this visit by clinical staff  Tobacco  Allergies  Meds  Med Hx  Surg Hx  Fam Hx  Soc Hx      Reviewed and updated as needed this visit by Provider         ROS:  Constitutional, HEENT, cardiovascular, pulmonary, gi and gu systems are negative, except as otherwise noted.    OBJECTIVE:                                                    BP (!) 144/94  Pulse 88  Temp 98.6  F (37  C) (Oral)  Resp 12  Ht 1.861 m (6' 1.25\")  Wt " 113.2 kg (249 lb 8 oz)  SpO2 99%  BMI 32.69 kg/m2  Body mass index is 32.69 kg/(m^2).  GENERAL: healthy, alert, no distress and obese  NECK: no adenopathy, no asymmetry, masses, or scars and thyroid normal to palpation  RESP: lungs clear to auscultation - no rales, rhonchi or wheezes  CV: regular rate and rhythm, normal S1 S2, no S3 or S4, no murmur, click or rub, no peripheral edema and peripheral pulses strong  ABDOMEN: soft, nontender, no hepatosplenomegaly, no masses and bowel sounds normal  MS: no gross musculoskeletal defects noted, no edema    Diagnostic Test Results:  none      ASSESSMENT/PLAN:                                                    1. Hypertension goal BP (blood pressure) < 140/90  New diagnosis.  Patient refused medication today.  Will try diet and increased exercise    2. Diarrhea, unspecified type  Doing well with OTC medication. He will call us with the name.      FUTURE APPOINTMENTS:       - Follow-up visit in 2 - 3 months for recheck.    Jaja Crane MD  [facility]

## 2017-06-08 NOTE — NURSING NOTE
"Chief Complaint   Patient presents with     Hypertension       Initial BP (!) 140/100 (BP Location: Right arm, Patient Position: Chair, Cuff Size: Adult Regular)  Pulse 88  Temp 98.6  F (37  C) (Oral)  Resp 12  Ht 1.861 m (6' 1.25\")  Wt 113.2 kg (249 lb 8 oz)  SpO2 99%  BMI 32.69 kg/m2 Estimated body mass index is 32.69 kg/(m^2) as calculated from the following:    Height as of this encounter: 1.861 m (6' 1.25\").    Weight as of this encounter: 113.2 kg (249 lb 8 oz).  Medication Reconciliation: complete     Bianka Jorgensen        "

## 2017-11-14 ENCOUNTER — OFFICE VISIT (OUTPATIENT)
Dept: FAMILY MEDICINE | Facility: CLINIC | Age: 47
End: 2017-11-14
Payer: COMMERCIAL

## 2017-11-14 VITALS
SYSTOLIC BLOOD PRESSURE: 130 MMHG | TEMPERATURE: 98.4 F | WEIGHT: 259.4 LBS | OXYGEN SATURATION: 98 % | BODY MASS INDEX: 33.99 KG/M2 | RESPIRATION RATE: 12 BRPM | DIASTOLIC BLOOD PRESSURE: 102 MMHG | HEART RATE: 82 BPM

## 2017-11-14 DIAGNOSIS — Z23 NEED FOR PROPHYLACTIC VACCINATION WITH TETANUS-DIPHTHERIA (TD): ICD-10-CM

## 2017-11-14 DIAGNOSIS — I10 HYPERTENSION GOAL BP (BLOOD PRESSURE) < 140/90: Primary | ICD-10-CM

## 2017-11-14 PROCEDURE — 90715 TDAP VACCINE 7 YRS/> IM: CPT | Performed by: NURSE PRACTITIONER

## 2017-11-14 PROCEDURE — 99213 OFFICE O/P EST LOW 20 MIN: CPT | Mod: 25 | Performed by: NURSE PRACTITIONER

## 2017-11-14 PROCEDURE — 90471 IMMUNIZATION ADMIN: CPT | Performed by: NURSE PRACTITIONER

## 2017-11-14 RX ORDER — LISINOPRIL 5 MG/1
5 TABLET ORAL DAILY
Qty: 30 TABLET | Refills: 0 | Status: SHIPPED | OUTPATIENT
Start: 2017-11-14 | End: 2017-12-12 | Stop reason: DRUGHIGH

## 2017-11-14 NOTE — PATIENT INSTRUCTIONS
Return in 2 weeks for nurse BP check. Then 4 weeks for provider BP check.     Lisinopril oral solution  Brand Name: QBRELIS  What is this medicine?  LISINOPRIL (lyse IN oh pril) is an ACE inhibitor. This medicine is used to treat high blood pressure and heart failure. It is also used to protect the heart immediately after a heart attack.  How should I use this medicine?  Take this medicine by mouth. Follow the directions on the prescription label. Use a specially marked spoon or container to measure each dose. Ask your pharmacist if you do not have one. Household spoons are not accurate. You may take this medicine with or without food. If it upsets your stomach, take it with food. Take your medicine at regular intervals. Do not take it more often than directed. Do not stop taking except on your doctor's advice.  Talk to your pediatrician regarding the use of this medicine in children. While this drug may be prescribed for children as young as 6 years for selected conditions, precautions do apply.  What side effects may I notice from receiving this medicine?  Side effects that you should report to your doctor or health care professional as soon as possible:    allergic reactions like skin rash, itching or hives, swelling of the hands, feet, face, lips, throat, or tongue    breathing problems    signs and symptoms of kidney injury like trouble passing urine or change in the amount of urine    signs and symptoms of increased potassium like muscle weakness; chest pain; or fast, irregular heartbeat    signs and symptoms of liver injury like dark yellow or brown urine; general ill feeling or flu-like symptoms; light-colored stools; loss of appetite; nausea; right upper belly pain; unusually weak or tired; yellowing of the eyes or skin    signs and symptoms of low blood pressure like dizziness; feeling faint or lightheaded, falls; unusually weak or tired    stomach pain with or without nausea and vomiting  Side effects  that usually do not require medical attention (report to your doctor or health care professional if they continue or are bothersome):    changes in taste    cough    dizziness    fever    headache    sensitivity to light  What may interact with this medicine?  Do not take this medicine with any of the following medications:    hymenoptera venom    sacubitril; valsartan  This medicines may also interact with the following medications:    aliskiren    angiotensin receptor blockers, like losartan or valsartan    certain medicines for diabetes    diuretics    everolimus    gold compounds    lithium    NSAIDs, medicines for pain and inflammation, like ibuprofen or naproxen    potassium salts or supplements    salt substitutes    sirolimus    temsirolimus  What if I miss a dose?  If you miss a dose, take it as soon as you can. If it is almost time for your next dose, take only that dose. Do not take double or extra doses.  Where should I keep my medicine?  Keep out of the reach of children.  Store at room temperature between 20 and 25 degrees C (68 to 77 degrees F). Thrown away any unused medicine after the expiration date.  What should I tell my health care provider before I take this medicine?  They need to know if you have any of these conditions:    diabetes    heart or blood vessel disease    kidney disease    low blood pressure    previous swelling of the tongue, face, or lips with difficulty breathing, difficulty swallowing, hoarseness, or tightening of the throat    an unusual or allergic reaction to lisinopril, other ACE inhibitors, insect venom, food, dyes, or preservatives    pregnant or trying to get pregnant    breast-feeding  What should I watch for while using this medicine?  Visit your doctor or health care professional for regular check ups. Check your blood pressure as directed. Ask your doctor what your blood pressure should be, and when you should contact him or her.  Do not treat yourself for coughs,  colds, or pain while you are using this medicine without asking your doctor or health care professional for advice. Some ingredients may increase your blood pressure.  Women should inform their doctor if they wish to become pregnant or think they might be pregnant. There is a potential for serious side effects to an unborn child. Talk to your health care professional or pharmacist for more information.  Check with your doctor or health care professional if you get an attack of severe diarrhea, nausea and vomiting, or if you sweat a lot. The loss of too much body fluid can make it dangerous for you to take this medicine.  You may get drowsy or dizzy. Do not drive, use machinery, or do anything that needs mental alertness until you know how this drug affects you. Do not stand or sit up quickly, especially if you are an older patient. This reduces the risk of dizzy or fainting spells. Alcohol can make you more drowsy and dizzy. Avoid alcoholic drinks.  Avoid salt substitutes unless you are told otherwise by your doctor or health care professional.  NOTE:This sheet is a summary. It may not cover all possible information. If you have questions about this medicine, talk to your doctor, pharmacist, or health care provider. Copyright  2017 [employer]

## 2017-11-14 NOTE — MR AVS SNAPSHOT
After Visit Summary   11/14/2017    Derrick Espinosa    MRN: [id number]           Patient Information     Date Of Birth          1970        Visit Information        Provider Department      11/14/2017 3:40 PM Rosemary Dowd NP [facility]        Today's Diagnoses     Hypertension goal BP (blood pressure) < 140/90    -  1    Need for prophylactic vaccination and inoculation against influenza        Need for prophylactic vaccination with tetanus-diphtheria (TD)          Care Instructions      Return in 2 weeks for nurse BP check. Then 4 weeks for provider BP check.     Lisinopril oral solution  Brand Name: QBRELIS  What is this medicine?  LISINOPRIL (lyse IN oh pril) is an ACE inhibitor. This medicine is used to treat high blood pressure and heart failure. It is also used to protect the heart immediately after a heart attack.  How should I use this medicine?  Take this medicine by mouth. Follow the directions on the prescription label. Use a specially marked spoon or container to measure each dose. Ask your pharmacist if you do not have one. Household spoons are not accurate. You may take this medicine with or without food. If it upsets your stomach, take it with food. Take your medicine at regular intervals. Do not take it more often than directed. Do not stop taking except on your doctor's advice.  Talk to your pediatrician regarding the use of this medicine in children. While this drug may be prescribed for children as young as 6 years for selected conditions, precautions do apply.  What side effects may I notice from receiving this medicine?  Side effects that you should report to your doctor or health care professional as soon as possible:    allergic reactions like skin rash, itching or hives, swelling of the hands, feet, face, lips, throat, or tongue    breathing problems    signs and symptoms of kidney injury like trouble passing urine or change in the amount of urine    signs and  symptoms of increased potassium like muscle weakness; chest pain; or fast, irregular heartbeat    signs and symptoms of liver injury like dark yellow or brown urine; general ill feeling or flu-like symptoms; light-colored stools; loss of appetite; nausea; right upper belly pain; unusually weak or tired; yellowing of the eyes or skin    signs and symptoms of low blood pressure like dizziness; feeling faint or lightheaded, falls; unusually weak or tired    stomach pain with or without nausea and vomiting  Side effects that usually do not require medical attention (report to your doctor or health care professional if they continue or are bothersome):    changes in taste    cough    dizziness    fever    headache    sensitivity to light  What may interact with this medicine?  Do not take this medicine with any of the following medications:    hymenoptera venom    sacubitril; valsartan  This medicines may also interact with the following medications:    aliskiren    angiotensin receptor blockers, like losartan or valsartan    certain medicines for diabetes    diuretics    everolimus    gold compounds    lithium    NSAIDs, medicines for pain and inflammation, like ibuprofen or naproxen    potassium salts or supplements    salt substitutes    sirolimus    temsirolimus  What if I miss a dose?  If you miss a dose, take it as soon as you can. If it is almost time for your next dose, take only that dose. Do not take double or extra doses.  Where should I keep my medicine?  Keep out of the reach of children.  Store at room temperature between 20 and 25 degrees C (68 to 77 degrees F). Thrown away any unused medicine after the expiration date.  What should I tell my health care provider before I take this medicine?  They need to know if you have any of these conditions:    diabetes    heart or blood vessel disease    kidney disease    low blood pressure    previous swelling of the tongue, face, or lips with difficulty breathing,  difficulty swallowing, hoarseness, or tightening of the throat    an unusual or allergic reaction to lisinopril, other ACE inhibitors, insect venom, food, dyes, or preservatives    pregnant or trying to get pregnant    breast-feeding  What should I watch for while using this medicine?  Visit your doctor or health care professional for regular check ups. Check your blood pressure as directed. Ask your doctor what your blood pressure should be, and when you should contact him or her.  Do not treat yourself for coughs, colds, or pain while you are using this medicine without asking your doctor or health care professional for advice. Some ingredients may increase your blood pressure.  Women should inform their doctor if they wish to become pregnant or think they might be pregnant. There is a potential for serious side effects to an unborn child. Talk to your health care professional or pharmacist for more information.  Check with your doctor or health care professional if you get an attack of severe diarrhea, nausea and vomiting, or if you sweat a lot. The loss of too much body fluid can make it dangerous for you to take this medicine.  You may get drowsy or dizzy. Do not drive, use machinery, or do anything that needs mental alertness until you know how this drug affects you. Do not stand or sit up quickly, especially if you are an older patient. This reduces the risk of dizzy or fainting spells. Alcohol can make you more drowsy and dizzy. Avoid alcoholic drinks.  Avoid salt substitutes unless you are told otherwise by your doctor or health care professional.  NOTE:This sheet is a summary. It may not cover all possible information. If you have questions about this medicine, talk to your doctor, pharmacist, or health care provider. Copyright  2017 Elsevier                Follow-ups after your visit        Who to contact     If you have questions or need follow up information about today's clinic visit or your schedule  please contact [facility] directly at [phone].  Normal or non-critical lab and imaging results will be communicated to you by MyChart, letter or phone within 4 business days after the clinic has received the results. If you do not hear from us within 7 days, please contact the clinic through [employer] or phone. If you have a critical or abnormal lab result, we will notify you by phone as soon as possible.  Submit refill requests through [employer] or call your pharmacy and they will forward the refill request to us. Please allow 3 business days for your refill to be completed.          Additional Information About Your Visit        [employer] Information     [employer] gives you secure access to your electronic health record. If you see a primary care provider, you can also send messages to your care team and make appointments. If you have questions, please call your primary care clinic.  If you do not have a primary care provider, please call [phone] and they will assist you.        Care EveryWhere ID     This is your Care EveryWhere ID. This could be used by other organizations to access your [facility] medical records  [id number]        Your Vitals Were     Pulse Temperature Respirations Pulse Oximetry BMI (Body Mass Index)       82 98.4  F (36.9  C) (Oral) 12 98% 33.99 kg/m2        Blood Pressure from Last 3 Encounters:   11/14/17 (!) 130/102   06/08/17 (!) 144/94   03/09/17 (!) 143/99    Weight from Last 3 Encounters:   11/14/17 117.7 kg (259 lb 6.4 oz)   06/08/17 113.2 kg (249 lb 8 oz)   03/09/17 109.2 kg (240 lb 11.2 oz)              We Performed the Following     ADMIN 1st VACCINE     TDAP VACCINE (ADACEL)          Today's Medication Changes          These changes are accurate as of: 11/14/17  3:57 PM.  If you have any questions, ask your nurse or doctor.               Start taking these medicines.        Dose/Directions    lisinopril 5 MG tablet   Commonly known as:  PRINIVIL/ZESTRIL   Used  for:  Hypertension goal BP (blood pressure) < 140/90   Started by:  Rosemary Dowd NP        Dose:  5 mg   Take 1 tablet (5 mg) by mouth daily   Quantity:  30 tablet   Refills:  0            Where to get your medicines      These medications were sent to [facility] [address] [id number] [facility] AT [address], [facility] [address]     Phone:  [phone]     lisinopril 5 MG tablet                Primary Care Provider Office Phone # Fax #    [facility] [place] [phone] [address] [facility] [address]        Equal Access to Services     GREGORIO BAHENA : Hadii lisbeth jimenez hadasho Soomaali, waaxda luqadaha, qaybta kaalmada adeegyada, xenia harris. So [facility] [phone].    ATENCIÓN: Si habla español, tiene a torrez disposición servicios gratuitos de asistencia lingüística. [facility] [phone].    We comply with applicable federal civil rights laws and [place] laws. We do not discriminate on the basis of race, color, national origin, age, disability, sex, sexual orientation, or gender identity.            Thank you!     Thank you for choosing [facility]  for your care. Our goal is always to provide you with excellent care. Hearing back from our patients is one way we can continue to improve our services. Please take a few minutes to complete the written survey that you may receive in the mail after your visit with us. Thank you!             Your Updated Medication List - Protect others around you: Learn how to safely use, store and throw away your medicines at www.disposemymeds.org.          This list is accurate as of: 11/14/17  3:57 PM.  Always use your most recent med list.                   Brand Name Dispense Instructions for use Diagnosis    lisinopril 5 MG tablet    PRINIVIL/ZESTRIL    30 tablet    Take 1 tablet (5 mg) by mouth daily    Hypertension goal BP (blood pressure) <  140/90

## 2017-11-14 NOTE — NURSING NOTE
Screening Questionnaire for Adult Immunization    Are you sick today?   No   Do you have allergies to medications, food, a vaccine component or latex?   No   Have you ever had a serious reaction after receiving a vaccination?   No   Do you have a long-term health problem with heart disease, lung disease, asthma, kidney disease, metabolic disease (e.g. diabetes), anemia, or other blood disorder?   No   Do you have cancer, leukemia, HIV/AIDS, or any other immune system problem?   No   In the past 3 months, have you taken medications that affect  your immune system, such as prednisone, other steroids, or anticancer drugs; drugs for the treatment of rheumatoid arthritis, Crohn s disease, or psoriasis; or have you had radiation treatments?   No   Have you had a seizure, or a brain or other nervous system problem?   No   During the past year, have you received a transfusion of blood or blood     products, or been given immune (gamma) globulin or antiviral drug?   No   For women: Are you pregnant or is there a chance you could become        pregnant during the next month?   No   Have you received any vaccinations in the past 4 weeks?   No     Immunization questionnaire answers were all negative.        Per orders of grady Dowd, injection of TDAP given by Bianka Jorgensen. Patient instructed to remain in clinic for 15 minutes afterwards, and to report any adverse reaction to me immediately.       Screening performed by Bianka Jorgensen on 11/14/2017 at 3:37 PM.

## 2017-11-14 NOTE — NURSING NOTE
"Chief Complaint   Patient presents with     Hypertension       Initial BP (!) 130/102 (BP Location: Right arm, Patient Position: Sitting, Cuff Size: Adult Large)  Pulse 82  Temp 98.4  F (36.9  C) (Oral)  Resp 12  Wt 117.7 kg (259 lb 6.4 oz)  SpO2 98%  BMI 33.99 kg/m2 Estimated body mass index is 33.99 kg/(m^2) as calculated from the following:    Height as of 6/8/17: 1.861 m (6' 1.25\").    Weight as of this encounter: 117.7 kg (259 lb 6.4 oz).  Medication Reconciliation: complete     Bianka Jorgensen      "

## 2017-11-14 NOTE — PROGRESS NOTES
SUBJECTIVE:   Derrick Espinosa is a 47 year old male who presents to clinic today for the following health issues:      Hypertension Follow-up      Outpatient blood pressures are not being checked.    Low Salt Diet: no added salt       Amount of exercise or physical activity: 1-3 days/week for an average of 30-45 minutes.     Problems taking medications regularly: No    Medication side effects: none    Diet: regular (no restrictions)-sometimes canned/proccessed foods. Does not add extra salt to food.     Was feeling slightly dizzy, head rushing recently. Feeling symptoms daily in past 2 weeks. No vision or hearing changes. occasional headaches. No N/V. Wanted to get symptoms checked out.     Has gained weight over past 3 years. Used to work out a lot but doesn't anymore. Walks a lot at work, up/down stairs in a 5-7 day work week. Is a .             Problem list and histories reviewed & adjusted, as indicated.  Additional history: as documented    Patient Active Problem List   Diagnosis     CARDIOVASCULAR SCREENING; LDL GOAL LESS THAN 160     NO ACTIVE PROBLEMS(aka NONE)     Alcohol abuse, episodic drinking behavior     Insomnia due to other mental disorder     Depression with anxiety     Hypertension goal BP (blood pressure) < 140/90     Diarrhea, unspecified type     Past Surgical History:   Procedure Laterality Date     ROTATOR CUFF REPAIR RT/LT  1988    Right     sports hernia  2010    R side inguinal hernia       Social History   Substance Use Topics     Smoking status: Never Smoker     Smokeless tobacco: Current User     Types: Chew      Comment: current some day use of chewing tobacco     Alcohol use Yes      Comment: occasional/social     Family History   Problem Relation Age of Onset     DIABETES Mother 50     Thyroid Disease Mother      DIABETES Father 50     Hypertension Father          Current Outpatient Prescriptions   Medication Sig Dispense Refill     lisinopril (PRINIVIL/ZESTRIL) 5  MG tablet Take 1 tablet (5 mg) by mouth daily 30 tablet 0     No Known Allergies      Reviewed and updated as needed this visit by clinical staffTobacco  Allergies  Meds  Problems  Med Hx  Surg Hx  Fam Hx  Soc Hx        Reviewed and updated as needed this visit by Provider  Allergies  Meds  Problems  Med Hx  Surg Hx  Fam Hx         ROS:  Constitutional, HEENT, cardiovascular, pulmonary systems are negative, except as otherwise noted.      OBJECTIVE:   BP (!) 130/102 (BP Location: Right arm, Patient Position: Sitting, Cuff Size: Adult Large)  Pulse 82  Temp 98.4  F (36.9  C) (Oral)  Resp 12  Wt 117.7 kg (259 lb 6.4 oz)  SpO2 98%  BMI 33.99 kg/m2  Body mass index is 33.99 kg/(m^2).  GENERAL: healthy, alert and no distress  RESP: lungs clear to auscultation - no rales, rhonchi or wheezes  CV: regular rate and rhythm, normal S1 S2, no S3 or S4, no murmur, click or rub    Diagnostic Test Results:  none     ASSESSMENT/PLAN:         1. Hypertension goal BP (blood pressure) < 140/90  Start medication. Discussed side effects. Call if not able to tolerate.   - lisinopril (PRINIVIL/ZESTRIL) 5 MG tablet; Take 1 tablet (5 mg) by mouth daily  Dispense: 30 tablet; Refill: 0    2. Need for prophylactic vaccination with tetanus-diphtheria (TD)  given  - TDAP VACCINE (ADACEL)  - ADMIN 1st VACCINE      FUTURE APPOINTMENTS:       - Follow-up visit Return in 2 weeks for nurse BP check. Then 4 weeks for provider BP check. May need labs in 4 weeks?       RODRIGO Peguero, NP-C  [facility]

## 2017-12-04 ENCOUNTER — ALLIED HEALTH/NURSE VISIT (OUTPATIENT)
Dept: NURSING | Facility: CLINIC | Age: 47
End: 2017-12-04
Payer: COMMERCIAL

## 2017-12-04 VITALS — DIASTOLIC BLOOD PRESSURE: 86 MMHG | SYSTOLIC BLOOD PRESSURE: 122 MMHG

## 2017-12-04 DIAGNOSIS — I10 HTN (HYPERTENSION): Primary | ICD-10-CM

## 2017-12-04 PROCEDURE — 99207 ZZC NO CHARGE NURSE ONLY: CPT

## 2017-12-04 NOTE — MR AVS SNAPSHOT
After Visit Summary   12/4/2017    Derrick Espinosa    MRN: [id number]           Patient Information     Date Of Birth          1970        Visit Information        Provider Department      12/4/2017 9:20 AM BA ANCILLARY [facility]        Today's Diagnoses     HTN (hypertension)    -  1       Follow-ups after your visit        Who to contact     If you have questions or need follow up information about today's clinic visit or your schedule please contact [facility] directly at [phone].  Normal or non-critical lab and imaging results will be communicated to you by MyChart, letter or phone within 4 business days after the clinic has received the results. If you do not hear from us within 7 days, please contact the clinic through [email or web address] or phone. If you have a critical or abnormal lab result, we will notify you by phone as soon as possible.  Submit refill requests through yuback or call your pharmacy and they will forward the refill request to us. Please allow 3 business days for your refill to be completed.          Additional Information About Your Visit        MyChart Information     yuback gives you secure access to your electronic health record. If you see a primary care provider, you can also send messages to your care team and make appointments. If you have questions, please call your primary care clinic.  If you do not have a primary care provider, please call [phone] and they will assist you.        Care EveryWhere ID     This is your Care EveryWhere ID. This could be used by other organizations to access your [place] medical records  [id number]         Blood Pressure from Last 3 Encounters:   12/04/17 122/86   11/14/17 (!) 130/102   06/08/17 (!) 144/94    Weight from Last 3 Encounters:   11/14/17 117.7 kg (259 lb 6.4 oz)   06/08/17 113.2 kg (249 lb 8 oz)   03/09/17 109.2 kg (240 lb 11.2 oz)              Today, you had the following     No orders  found for display       Primary Care Provider Office Phone # Fax #    [facility] [phone] [address] [facility] [address]        Equal Access to Services     GREGORIO BAHENA : Hadii lisbeth ku hadmrainao Sosushilali, waaxda luqadaha, qaybta kaalmada adefinesseda, xenia trivedi linnea harris. So [facility] [phone].    ATENCIÓN: Si habla español, tiene a torrez disposición servicios gratuitos de asistencia lingüística. Llame al [phone].    We comply with applicable federal civil rights laws and [place] laws. We do not discriminate on the basis of race, color, national origin, age, disability, sex, sexual orientation, or gender identity.            Thank you!     Thank you for choosing [facility]  for your care. Our goal is always to provide you with excellent care. Hearing back from our patients is one way we can continue to improve our services. Please take a few minutes to complete the written survey that you may receive in the mail after your visit with us. Thank you!             Your Updated Medication List - Protect others around you: Learn how to safely use, store and throw away your medicines at www.disposemymeds.org.          This list is accurate as of: 12/4/17  9:25 AM.  Always use your most recent med list.                   Brand Name Dispense Instructions for use Diagnosis    lisinopril 5 MG tablet    PRINIVIL/ZESTRIL    30 tablet    Take 1 tablet (5 mg) by mouth daily    Hypertension goal BP (blood pressure) < 140/90

## 2017-12-04 NOTE — NURSING NOTE
Derrick Espinosa is a 47 year old male who comes in today for a Blood Pressure check because of new medication.    *Document pulse and BP  *Use new set of vitals button for multiple readings.  *Use extended vitals for orthostatic    Vitals as recorded, a regular cuff was used.    Patient is not taking medication as prescribed  Patient is tolerating medications well.  Patient is not monitoring Blood Pressure at home.      Current complaints: light-headedness    Disposition: results routed to MD/AP    PT RUNNING LOW ON RX AND WOULD NEED A REFILL

## 2017-12-12 ENCOUNTER — OFFICE VISIT (OUTPATIENT)
Dept: FAMILY MEDICINE | Facility: CLINIC | Age: 47
End: 2017-12-12
Payer: COMMERCIAL

## 2017-12-12 VITALS
TEMPERATURE: 97.9 F | HEART RATE: 73 BPM | WEIGHT: 261 LBS | BODY MASS INDEX: 34.59 KG/M2 | SYSTOLIC BLOOD PRESSURE: 130 MMHG | RESPIRATION RATE: 19 BRPM | DIASTOLIC BLOOD PRESSURE: 90 MMHG | HEIGHT: 73 IN | OXYGEN SATURATION: 99 %

## 2017-12-12 DIAGNOSIS — I10 HYPERTENSION GOAL BP (BLOOD PRESSURE) < 140/90: Primary | ICD-10-CM

## 2017-12-12 DIAGNOSIS — R21 RASH: ICD-10-CM

## 2017-12-12 LAB
ANION GAP SERPL CALCULATED.3IONS-SCNC: 11 MMOL/L (ref 3–14)
BUN SERPL-MCNC: 10 MG/DL (ref 7–30)
CALCIUM SERPL-MCNC: 9.1 MG/DL (ref 8.5–10.1)
CHLORIDE SERPL-SCNC: 102 MMOL/L (ref 94–109)
CO2 SERPL-SCNC: 28 MMOL/L (ref 20–32)
CREAT SERPL-MCNC: 0.88 MG/DL (ref 0.66–1.25)
GFR SERPL CREATININE-BSD FRML MDRD: >90 ML/MIN/1.7M2
GLUCOSE SERPL-MCNC: 84 MG/DL (ref 70–99)
POTASSIUM SERPL-SCNC: 3.8 MMOL/L (ref 3.4–5.3)
SODIUM SERPL-SCNC: 141 MMOL/L (ref 133–144)

## 2017-12-12 PROCEDURE — 36415 COLL VENOUS BLD VENIPUNCTURE: CPT | Performed by: PHYSICIAN ASSISTANT

## 2017-12-12 PROCEDURE — 80048 BASIC METABOLIC PNL TOTAL CA: CPT | Performed by: PHYSICIAN ASSISTANT

## 2017-12-12 PROCEDURE — 99213 OFFICE O/P EST LOW 20 MIN: CPT | Performed by: PHYSICIAN ASSISTANT

## 2017-12-12 RX ORDER — LISINOPRIL 10 MG/1
10 TABLET ORAL DAILY
Qty: 30 TABLET | Refills: 1 | Status: SHIPPED | OUTPATIENT
Start: 2017-12-12 | End: 2018-02-27

## 2017-12-12 RX ORDER — TRIAMCINOLONE ACETONIDE 1 MG/G
CREAM TOPICAL
Qty: 60 G | Refills: 0 | Status: SHIPPED | OUTPATIENT
Start: 2017-12-12 | End: 2018-05-16

## 2017-12-12 ASSESSMENT — PATIENT HEALTH QUESTIONNAIRE - PHQ9
SUM OF ALL RESPONSES TO PHQ QUESTIONS 1-9: 0
5. POOR APPETITE OR OVEREATING: NOT AT ALL

## 2017-12-12 ASSESSMENT — PAIN SCALES - GENERAL: PAINLEVEL: NO PAIN (0)

## 2017-12-12 ASSESSMENT — ANXIETY QUESTIONNAIRES
6. BECOMING EASILY ANNOYED OR IRRITABLE: NOT AT ALL
GAD7 TOTAL SCORE: 0
7. FEELING AFRAID AS IF SOMETHING AWFUL MIGHT HAPPEN: NOT AT ALL
3. WORRYING TOO MUCH ABOUT DIFFERENT THINGS: NOT AT ALL
2. NOT BEING ABLE TO STOP OR CONTROL WORRYING: NOT AT ALL
1. FEELING NERVOUS, ANXIOUS, OR ON EDGE: NOT AT ALL
5. BEING SO RESTLESS THAT IT IS HARD TO SIT STILL: NOT AT ALL
IF YOU CHECKED OFF ANY PROBLEMS ON THIS QUESTIONNAIRE, HOW DIFFICULT HAVE THESE PROBLEMS MADE IT FOR YOU TO DO YOUR WORK, TAKE CARE OF THINGS AT HOME, OR GET ALONG WITH OTHER PEOPLE: NOT DIFFICULT AT ALL

## 2017-12-12 NOTE — PATIENT INSTRUCTIONS
Apply topical steroid cream to affected areas and follow up with us for biopsy (40 minute appointment with one of the physicians- Dr. Plascencia, Dr. Ramos or Dr. Combs  ) if no improvement .    Increase lisinopril to 10 mg daily and follow up with us in approximately one month.  Return urgently if any change in symptoms.      At [facility], we strive to deliver an exceptional experience to you, every time we see you.  If you receive a survey in the mail, please send us back your thoughts. We really do value your feedback.    Suggested websites for health information:  Www.[employer].org : Up to date and easily searchable information on multiple topics.  Www.medlineplus.gov : medication info, interactive tutorials, watch real surgeries online  Www.familydoctor.org : good info from the Academy of Family Physicians  Www.cdc.gov : public health info, travel advisories, epidemics (H1N1)  Www.aap.org : children's health info, normal development, vaccinations  Www.health.[facility].[place].us : [place] dept of health, public health issues in [place], N1N1    Your care team:     Family Medicine   MARYURI Avery MD Emily Bunt, APRN [facility]   S. MD Shey Lugo MD Angela Wermerskirchen, MD         Clinic hours: Monday - Wednesday 7 am-7 pm   Thursdays and Fridays 7 am-5 pm.     [place] [facility]: Monday - Friday 11 am-9 pm,   Saturday and Sunday 9 am-5 pm.    [place] [facility]: Monday -Thursday 8 am-8 pm; Friday 8 am-6 pm; Saturday and Sunday 9 am-5 pm.     [facility]: Monday Thursday 8 am   7 pm; Friday 8 am   6 pm    Clinic: [phone]   [facility]: [phone]   [facility]: [phone]

## 2017-12-12 NOTE — NURSING NOTE
"Chief Complaint   Patient presents with     Hypertension       Initial /90  Pulse 73  Temp 97.9  F (36.6  C) (Oral)  Resp 19  Ht 1.861 m (6' 1.25\")  Wt 118.4 kg (261 lb)  SpO2 99%  BMI 34.2 kg/m2 Estimated body mass index is 34.2 kg/(m^2) as calculated from the following:    Height as of this encounter: 1.861 m (6' 1.25\").    Weight as of this encounter: 118.4 kg (261 lb).  Medication Reconciliation: complete     Rachael Kebede MA       "

## 2017-12-12 NOTE — PROGRESS NOTES
"  SUBJECTIVE:   Derrick Espinosa is a 47 year old male who presents to clinic today for the following health issues:      Hypertension Follow-up      Outpatient blood pressures are not being checked.    Low Salt Diet: no added salt        Amount of exercise or physical activity: 2-3 days a week    Problems taking medications regularly: No    Medication side effects: lightheaded    Diet: regular (no restrictions)      Reports blood pressure have improved.  Will occasionally have some lightheadedness.  Before starting med noted a \"rushing of his heart beat\"  This has improved.    Trying to eat better.  Instead of breakfast sandwich eating a banana.  Has caught self borderline dehydrated and drinking pedialyte which has helped.  Works as  and on feet all day    On  Treadmill couple times a week and walking at an incline.  No chest pain or shortness of breath with activity.  No palpitations    Rash right lower extremity and a bit on left lower extremity for months- will seem to scab over but never heals completely.    No rash of scalp or any other place on body.  No involvement of knees or other joints      Problem list and histories reviewed & adjusted, as indicated.  Additional history: as documented    Patient Active Problem List   Diagnosis     CARDIOVASCULAR SCREENING; LDL GOAL LESS THAN 160     NO ACTIVE PROBLEMS(aka NONE)     Alcohol abuse, episodic drinking behavior     Insomnia due to other mental disorder     Depression with anxiety     Hypertension goal BP (blood pressure) < 140/90     Diarrhea, unspecified type     Past Surgical History:   Procedure Laterality Date     ROTATOR CUFF REPAIR RT/LT  1988    Right     sports hernia  2010    R side inguinal hernia       Social History   Substance Use Topics     Smoking status: Never Smoker     Smokeless tobacco: Current User     Types: Chew      Comment: current some day use of chewing tobacco     Alcohol use Yes      Comment: occasional/social     " "Family History   Problem Relation Age of Onset     DIABETES Mother 50     Thyroid Disease Mother      DIABETES Father 50     Hypertension Father          Current Outpatient Prescriptions   Medication Sig Dispense Refill     lisinopril (PRINIVIL/ZESTRIL) 5 MG tablet Take 1 tablet (5 mg) by mouth daily 30 tablet 1                Reviewed and updated as needed this visit by clinical staffTobacco  Allergies  Meds  Problems  Med Hx  Surg Hx  Fam Hx  Soc Hx        Reviewed and updated as needed this visit by Provider  Allergies  Meds  Problems         ROS:  Constitutional, HEENT, cardiovascular, pulmonary, gi and gu systems are negative, except as otherwise noted.      OBJECTIVE:   /90  Pulse 73  Temp 97.9  F (36.6  C) (Oral)  Resp 19  Ht 1.861 m (6' 1.25\")  Wt 118.4 kg (261 lb)  SpO2 99%  BMI 34.2 kg/m2  Body mass index is 34.2 kg/(m^2).  GENERAL: healthy, alert and no distress  NECK: no adenopathy, no asymmetry, masses, or scars and thyroid normal to palpation  RESP: lungs clear to auscultation - no rales, rhonchi or wheezes  CV: regular rate and rhythm, normal S1 S2, no S3 or S4, no murmur, click or rub, no peripheral edema and peripheral pulses strong  ABDOMEN: soft, nontender, no hepatosplenomegaly, no masses and bowel sounds normal  MS: no gross musculoskeletal defects noted, no edema  Skin - right lower extremity mid tibia with plaque irregular bordered scaling erythematous lesion - no central clearing.  No raised edges.  Similar rash left lower extremity.  No rash of knees  Diagnostic Test Results:  Results for orders placed or performed in visit on 12/12/17   Basic metabolic panel   Result Value Ref Range    Sodium 141 133 - 144 mmol/L    Potassium 3.8 3.4 - 5.3 mmol/L    Chloride 102 94 - 109 mmol/L    Carbon Dioxide 28 20 - 32 mmol/L    Anion Gap 11 3 - 14 mmol/L    Glucose 84 70 - 99 mg/dL    Urea Nitrogen 10 7 - 30 mg/dL    Creatinine 0.88 0.66 - 1.25 mg/dL    GFR Estimate >90 >60 " "mL/min/1.7m2    GFR Estimate If Black >90 >60 mL/min/1.7m2    Calcium 9.1 8.5 - 10.1 mg/dL       ASSESSMENT/PLAN:         BMI:   Estimated body mass index is 34.2 kg/(m^2) as calculated from the following:    Height as of this encounter: 1.861 m (6' 1.25\").    Weight as of this encounter: 118.4 kg (261 lb).   Weight management plan: Discussed healthy diet and exercise guidelines and patient will follow up in 1 month in clinic to re-evaluate.        1. Hypertension goal BP (blood pressure) < 140/90  Blood pressure not at goal.  Will increase lisinopril from 5 mg daily to 10 mg daily and recheck in one month.  Congratulated on efforts.  encouarged to continue to make lifestyle changes and work on weight loss.    - Basic metabolic panel  - lisinopril (PRINIVIL/ZESTRIL) 10 MG tablet; Take 1 tablet (10 mg) by mouth daily  Dispense: 30 tablet; Refill: 1    2. Rash  Trial of triamcinolone and follow up for biopsy of area if doesn't heal  - triamcinolone (KENALOG) 0.1 % cream; Apply sparingly to affected area three times daily as needed for fourteen days  Dispense: 60 g; Refill: 0    Patient Instructions     Apply topical steroid cream to affected areas and follow up with us for biopsy (40 minute appointment with one of the physicians- Dr. Plascencia, Dr. Ramos or Dr. Combs  ) if no improvement .    Increase lisinopril to 10 mg daily and follow up with us in approximately one month.  Return urgently if any change in symptoms.      At [facility], we strive to deliver an exceptional experience to you, every time we see you.  If you receive a survey in the mail, please send us back your thoughts. We really do value your feedback.    Suggested websites for health information:  Www.[place].org : Up to date and easily searchable information on multiple topics.  Www.medlineplus.gov : medication info, interactive tutorials, watch real surgeries online  Www.familydoctor.org : good info from the Academy of " Family Physicians  Www.cdc.gov : public health info, travel advisories, epidemics (H1N1)  Www.aap.org : children's health info, normal development, vaccinations  Www.health.[facility].[place].us : [place] dept of health, public health issues in [place], N1N1    Your care team:     Family Medicine   MARYURI Avery MD Emily Bunt, APRN [facility]   S. MD Shey Lugo MD Angela Wermerskirchen, MD         Clinic hours: Monday - Wednesday 7 am-7 pm   Thursdays and Fridays 7 am-5 pm.     [place] [facility]: Monday - Friday 11 am-9 pm,   Saturday and Sunday 9 am-5 pm.    [facility] [place] [facility]: Monday -Thursday 8 am-8 pm; Friday 8 am-6 pm; Saturday and Sunday 9 am-5 pm.     [facility]: Monday - Thursday 8 am - 7 pm; Friday 8 am - 6 pm    Clinic: [phone]   [facility]: [phone]   [facility]: [phone]               Harriet Patten PA-C  [facility]

## 2017-12-12 NOTE — MR AVS SNAPSHOT
After Visit Summary   12/12/2017    Derrick Espinosa    MRN: [id number]           Patient Information     Date Of Birth          1970        Visit Information        Provider Department      12/12/2017 3:20 PM Harriet Patten PA-C [facility]        Today's Diagnoses     Hypertension goal BP (blood pressure) < 140/90    -  1    Rash          Care Instructions    Apply topical steroid cream to affected areas and follow up with us for biopsy (40 minute appointment with one of the physicians- Dr. Plascencia, Dr. Ramos or Dr. Combs  ) if no improvement .    Increase lisinopril to 10 mg daily and follow up with us in approximately one month.  Return urgently if any change in symptoms.      At [facility], we strive to deliver an exceptional experience to you, every time we see you.  If you receive a survey in the mail, please send us back your thoughts. We really do value your feedback.    Suggested websites for health information:  Www.[employer] [employer].ReadOz : Up to date and easily searchable information on multiple topics.  Www.medlineplus.gov : medication info, interactive tutorials, watch real surgeries online  Www.familydoctor.org : good info from the Academy of Family Physicians  Www.cdc.gov : public health info, travel advisories, epidemics (H1N1)  Www.aap.org : children's health info, normal development, vaccinations  Www.health.[facility].mn.us : [place] dept of health, public health issues in [place], N1N1    Your care team:     Family Medicine   MARYURI Avery MD Emily Bunt, APRN [facility]   S. MD Shey Lugo MD Angela Wermerskirchen, MD         Clinic hours: Monday - Wednesday 7 am-7 pm   Thursdays and Fridays 7 am-5 pm.     [place] [facility]: Monday - Friday 11 am-9 pm,   Saturday and Sunday 9 am-5 pm.    [place] [facility]: Monday -Thursday 8 am-8 pm; Friday 8 am-6 pm; Saturday and Sunday 9 am-5 pm.     [facility]: Monday - Thursday 8 am - 7 pm; Friday 8 am - 6 pm    Clinic: [phone]   [facility]: [phone]   [facility]: [phone]                  Follow-ups after your visit        Who to contact     If you have questions or need follow up information about today's clinic visit or your schedule please contact [facility] directly at [phone].  Normal or non-critical lab and imaging results will be communicated to you by Canarahart, letter or phone within 4 business days after the clinic has received the results. If you do not hear from us within 7 days, please contact the clinic through [employer] Sim[employer] or phone. If you have a critical or abnormal lab result, we will notify you by phone as soon as possible.  Submit refill requests through [employer] or call your pharmacy and they will forward the refill request to us. Please allow 3 business days for your refill to be completed.          Additional Information About Your Visit        Canarahart Information     [employer] gives you secure access to your electronic health record. If you see a primary care provider, you can also send messages to your care team and make appointments. If you have questions, please call your primary care clinic.  If you do not have a primary care provider, please call [phone] and they will assist you.        Care EveryWhere ID     This is your Care EveryWhere ID. This could be used by other organizations to access your [place] medical records  [id number]        Your Vitals Were     Pulse Temperature Respirations Height Pulse Oximetry BMI (Body Mass Index)    73 97.9  F (36.6  C) (Oral) 19 1.861 m (6' 1.25\") 99% 34.2 kg/m2       Blood Pressure from Last 3 Encounters:   12/12/17 130/90   12/04/17 122/86   11/14/17 (!) 130/102    Weight from Last 3 Encounters:   12/12/17 118.4 kg (261 lb)   11/14/17 117.7 kg (259 lb 6.4 oz)   06/08/17 113.2 kg (249 lb 8 oz)              We " Performed the Following     Basic metabolic panel          Today's Medication Changes          These changes are accurate as of: 12/12/17  3:27 PM.  If you have any questions, ask your nurse or doctor.               Start taking these medicines.        Dose/Directions    triamcinolone 0.1 % cream   Commonly known as:  KENALOG   Used for:  Rash   Started by:  Harriet Patten PA-C        Apply sparingly to affected area three times daily as needed for fourteen days   Quantity:  60 g   Refills:  0         These medicines have changed or have updated prescriptions.        Dose/Directions    * lisinopril 5 MG tablet   Commonly known as:  PRINIVIL/ZESTRIL   This may have changed:  Another medication with the same name was added. Make sure you understand how and when to take each.   Used for:  Hypertension goal BP (blood pressure) < 140/90   Changed by:  Rosemary Dowd NP        Dose:  5 mg   Take 1 tablet (5 mg) by mouth daily   Quantity:  30 tablet   Refills:  0       * lisinopril 10 MG tablet   Commonly known as:  PRINIVIL/ZESTRIL   This may have changed:  You were already taking a medication with the same name, and this prescription was added. Make sure you understand how and when to take each.   Used for:  Hypertension goal BP (blood pressure) < 140/90   Changed by:  Harriet Patten PA-C        Dose:  10 mg   Take 1 tablet (10 mg) by mouth daily   Quantity:  30 tablet   Refills:  1       * Notice:  This list has 2 medication(s) that are the same as other medications prescribed for you. Read the directions carefully, and ask your doctor or other care provider to review them with you.         Where to get your medicines      [facility] [address] [place] [address] [address] [facility] [address] [place] [address]     Phone:  [phone]     lisinopril 10 MG tablet    triamcinolone 0.1 % cream                Primary Care Provider  Office Phone # Fax #    [facility] [phone] [address] [facility] [address]        Equal Access to Services     GREGORIO BAHENA : Hadii aad ku hadmarinadevora Soshanda, wamaggieda luqadaha, qaybta kaalmada karolyn, xenia pagekarlo georgeraul trivedi linnea harris. So [facility] [place] [facility] [phone].    ATENCIÓN: Si habla español, tiene a torrez disposición servicios gratuitos de asistencia lingüística. Llame al [phone].    We comply with applicable federal civil rights laws and [place] laws. We do not discriminate on the basis of race, color, national origin, age, disability, sex, sexual orientation, or gender identity.            Thank you!     Thank you for choosing [facility]  for your care. Our goal is always to provide you with excellent care. Hearing back from our patients is one way we can continue to improve our services. Please take a few minutes to complete the written survey that you may receive in the mail after your visit with us. Thank you!             Your Updated Medication List - Protect others around you: Learn how to safely use, store and throw away your medicines at www.disposemymeds.org.          This list is accurate as of: 12/12/17  3:27 PM.  Always use your most recent med list.                   Brand Name Dispense Instructions for use Diagnosis    * lisinopril 5 MG tablet    PRINIVIL/ZESTRIL    30 tablet    Take 1 tablet (5 mg) by mouth daily    Hypertension goal BP (blood pressure) < 140/90       * lisinopril 10 MG tablet    PRINIVIL/ZESTRIL    30 tablet    Take 1 tablet (10 mg) by mouth daily    Hypertension goal BP (blood pressure) < 140/90       triamcinolone 0.1 % cream    KENALOG    60 g    Apply sparingly to affected area three times daily as needed for fourteen days    Rash       * Notice:  This list has 2 medication(s) that are the same as other medications prescribed for you. Read the directions carefully, and ask your doctor or other care  provider to review them with you.

## 2017-12-13 ASSESSMENT — ANXIETY QUESTIONNAIRES: GAD7 TOTAL SCORE: 0

## 2017-12-13 NOTE — PROGRESS NOTES
Tk Meza  Your electrolytes, blood sugar and kidney function were normal.   Please call or MyChart my office with any questions or concerns.    Harriet Patten, PAC

## 2018-02-27 ENCOUNTER — TELEPHONE (OUTPATIENT)
Dept: FAMILY MEDICINE | Facility: CLINIC | Age: 48
End: 2018-02-27

## 2018-02-27 DIAGNOSIS — I10 HYPERTENSION GOAL BP (BLOOD PRESSURE) < 140/90: ICD-10-CM

## 2018-02-27 NOTE — TELEPHONE ENCOUNTER
"Requested Prescriptions   Pending Prescriptions Disp Refills     lisinopril (PRINIVIL/ZESTRIL) 10 MG tablet  Last Written Prescription Date:  12/12/17  Last Fill Quantity: 30 tablet,  # refills: 1   Last office visit: 12/12/2017 with prescribing provider:  Harriet LO   Future Office Visit:   30 tablet 1     Sig: Take 1 tablet (10 mg) by mouth daily    ACE Inhibitors (Including Combos) Protocol Failed    2/27/2018  3:03 PM       Failed - Blood pressure under 140/90 in past 12 months    BP Readings from Last 3 Encounters:   12/12/17 130/90   12/04/17 122/86   11/14/17 (!) 130/102                Passed - Recent or future visit with authorizing provider's specialty    Patient had office visit in the last year or has a visit in the next 30 days with authorizing provider.  See \"Patient Info\" tab in inbasket, or \"Choose Columns\" in Meds & Orders section of the refill encounter.            Passed - Patient is age 18 or older       Passed - Normal serum creatinine on file in past 12 months    Recent Labs   Lab Test  12/12/17   1526   CR  0.88            Passed - Normal serum potassium on file in past 12 months    Recent Labs   Lab Test  12/12/17   1526   POTASSIUM  3.8               "

## 2018-02-28 RX ORDER — LISINOPRIL 10 MG/1
10 TABLET ORAL DAILY
Qty: 30 TABLET | Refills: 0 | Status: SHIPPED | OUTPATIENT
Start: 2018-02-28 | End: 2018-04-16

## 2018-02-28 NOTE — TELEPHONE ENCOUNTER
Routing refill request to provider for review/approval because:  BP above goal  Lizzy Moralez RN

## 2018-04-16 DIAGNOSIS — I10 HYPERTENSION GOAL BP (BLOOD PRESSURE) < 140/90: ICD-10-CM

## 2018-04-16 NOTE — TELEPHONE ENCOUNTER
"Requested Prescriptions   Pending Prescriptions Disp Refills     lisinopril (PRINIVIL/ZESTRIL) 10 MG tablet 30 tablet 0     Sig: Take 1 tablet (10 mg) by mouth daily NO FURTHER REFILLS WITHOUT FOLLOW UP    ACE Inhibitors (Including Combos) Protocol Failed    4/16/2018 10:46 AM       Failed - Blood pressure under 140/90 in past 12 months    BP Readings from Last 3 Encounters:   12/12/17 130/90   12/04/17 122/86   11/14/17 (!) 130/102                Failed - Recent (12 mo) or future (30 days) visit within the authorizing provider's specialty    Patient had office visit in the last 12 months or has a visit in the next 30 days with authorizing provider or within the authorizing provider's specialty.  See \"Patient Info\" tab in inbasket, or \"Choose Columns\" in Meds & Orders section of the refill encounter.           Passed - Patient is age 18 or older       Passed - Normal serum creatinine on file in past 12 months    Recent Labs   Lab Test  12/12/17   1526   CR  0.88            Passed - Normal serum potassium on file in past 12 months    Recent Labs   Lab Test  12/12/17   1526   POTASSIUM  3.8             lisinopril (PRINIVIL/ZESTRIL) 10 MG tablet  Last Written Prescription Date:  2/28/18  Last Fill Quantity: 30,  # refills: 0   Last office visit: 12/12/2017 with prescribing provider:  Harriet Patten   Future Office Visit:      "

## 2018-04-17 RX ORDER — LISINOPRIL 10 MG/1
10 TABLET ORAL DAILY
Qty: 15 TABLET | Refills: 0 | Status: SHIPPED | OUTPATIENT
Start: 2018-04-17 | End: 2018-05-16

## 2018-04-17 NOTE — TELEPHONE ENCOUNTER
This writer attempted to contact pt on 04/17/18      Reason for call appt and left detailed message.      If patient calls back:   Schedule Office Visit/lab appointment asap with PCP and lab, document that pt called and close encounter         Bianka Jorgensen CMA

## 2018-05-16 ENCOUNTER — OFFICE VISIT (OUTPATIENT)
Dept: FAMILY MEDICINE | Facility: CLINIC | Age: 48
End: 2018-05-16
Payer: COMMERCIAL

## 2018-05-16 VITALS
TEMPERATURE: 98.7 F | OXYGEN SATURATION: 95 % | WEIGHT: 256.4 LBS | RESPIRATION RATE: 20 BRPM | SYSTOLIC BLOOD PRESSURE: 130 MMHG | HEIGHT: 72 IN | BODY MASS INDEX: 34.73 KG/M2 | DIASTOLIC BLOOD PRESSURE: 86 MMHG | HEART RATE: 105 BPM

## 2018-05-16 DIAGNOSIS — F41.9 ANXIETY: ICD-10-CM

## 2018-05-16 DIAGNOSIS — Z13.220 SCREENING FOR CHOLESTEROL LEVEL: ICD-10-CM

## 2018-05-16 DIAGNOSIS — E66.811 CLASS 1 OBESITY DUE TO EXCESS CALORIES WITH SERIOUS COMORBIDITY AND BODY MASS INDEX (BMI) OF 34.0 TO 34.9 IN ADULT: ICD-10-CM

## 2018-05-16 DIAGNOSIS — Z00.00 ENCOUNTER FOR ROUTINE ADULT HEALTH EXAMINATION WITHOUT ABNORMAL FINDINGS: Primary | ICD-10-CM

## 2018-05-16 DIAGNOSIS — Z83.3 FAMILY HISTORY OF DIABETES MELLITUS: ICD-10-CM

## 2018-05-16 DIAGNOSIS — I10 HYPERTENSION GOAL BP (BLOOD PRESSURE) < 140/90: ICD-10-CM

## 2018-05-16 DIAGNOSIS — R74.8 ELEVATED LIVER ENZYMES: ICD-10-CM

## 2018-05-16 DIAGNOSIS — R06.83 SNORES: ICD-10-CM

## 2018-05-16 DIAGNOSIS — E66.09 CLASS 1 OBESITY DUE TO EXCESS CALORIES WITH SERIOUS COMORBIDITY AND BODY MASS INDEX (BMI) OF 34.0 TO 34.9 IN ADULT: ICD-10-CM

## 2018-05-16 PROCEDURE — 99396 PREV VISIT EST AGE 40-64: CPT | Performed by: NURSE PRACTITIONER

## 2018-05-16 PROCEDURE — 99214 OFFICE O/P EST MOD 30 MIN: CPT | Mod: 25 | Performed by: NURSE PRACTITIONER

## 2018-05-16 RX ORDER — SERTRALINE HYDROCHLORIDE 25 MG/1
25 TABLET, FILM COATED ORAL DAILY
Qty: 60 TABLET | Refills: 0 | Status: SHIPPED | OUTPATIENT
Start: 2018-05-16 | End: 2018-07-18

## 2018-05-16 RX ORDER — LOSARTAN POTASSIUM 25 MG/1
12.5 TABLET ORAL DAILY
Qty: 30 TABLET | Refills: 0 | Status: SHIPPED | OUTPATIENT
Start: 2018-05-16 | End: 2018-07-18

## 2018-05-16 RX ORDER — LORAZEPAM 0.5 MG/1
0.5 TABLET ORAL DAILY PRN
Qty: 10 TABLET | Refills: 0 | Status: SHIPPED | OUTPATIENT
Start: 2018-05-16

## 2018-05-16 ASSESSMENT — PAIN SCALES - GENERAL: PAINLEVEL: NO PAIN (0)

## 2018-05-16 NOTE — PATIENT INSTRUCTIONS
Return in 4-8 weeks, sooner if concerns.       Preventive Health Recommendations  Male Ages 40 to 49    Yearly exam:             See your health care provider every year in order to  o   Review health changes.   o   Discuss preventive care.    o   Review your medicines if your doctor has prescribed any.    You should be tested each year for STDs (sexually transmitted diseases) if you re at risk.     Have a cholesterol test every 5 years.     Have a colonoscopy (test for colon cancer) if someone in your family has had colon cancer or polyps before age 50.     After age 45, have a diabetes test (fasting glucose). If you are at risk for diabetes, you should have this test every 3 years.      Talk with your health care provider about whether or not a prostate cancer screening test (PSA) is right for you.    Shots: Get a flu shot each year. Get a tetanus shot every 10 years.     Nutrition:    Eat at least 5 servings of fruits and vegetables daily.     Eat whole-grain bread, whole-wheat pasta and brown rice instead of white grains and rice.     Talk to your provider about Calcium and Vitamin D.     Lifestyle    Exercise for at least 150 minutes a week (30 minutes a day, 5 days a week). This will help you control your weight and prevent disease.     Limit alcohol to one drink per day.     No smoking.     Wear sunscreen to prevent skin cancer.     See your dentist every six months for an exam and cleaning.

## 2018-05-16 NOTE — PROGRESS NOTES
SUBJECTIVE:   CC: Derrick Espinosa is an 47 year old male who presents for preventative health visit.     Healthy Habits:    Do you get at least three servings of calcium containing foods daily (dairy, green leafy vegetables, etc.)? Yes. Bringing in own food to work.     Amount of exercise or daily activities, outside of work: 3-5 day(s) per week. Will go for walks. Walks a lot at work.     Problems taking medications regularly No    Medication side effects: No    Have you had an eye exam in the past two years? yes    Do you see a dentist twice per year? no    Do you have sleep apnea, excessive snoring or daytime drowsiness?snoring     Has had problems catching his breath about 3-4x/week over the past few months. Occurs mostly when at work, climbing stairs, walking. Not occur when sitting or when off work. he has a 2nd floor apartment and doesn't get SOB when going up those stairs. When he feels stressful feels heart palpitations also. Denies chest pain during these episodes, more tightness. Mother's day driving home he had panic attack: face and chest got real tight, he had trouble breathing. It was a stressful day, his parents have health issues, he also has problems at home. This has happened in the past where it woke him up in the middle of the night and he had to take a few deep breaths-2x in past few years.     Is a . Works 12 hr shifts, works 2-3 then off 2-3 days.     Ever since taking lisinopril getting dizziness throughout the day. Has not improved since starting medication.     He snores when he sleeps. Gets up very early in the morning, feels fine once he gets moving. Denies fatigue. We talked about maybe sleep study in the future.    Bowels normal. occasional diarrhea.     No smoke.  Alcohol: few drinks a week        Today's PHQ-2 Score:   PHQ-2 ( 1999 Pfizer) 12/12/2017 3/9/2017   Q1: Little interest or pleasure in doing things 0 0   Q2: Feeling down, depressed or hopeless 0 0    PHQ-2 Score 0 0       Abuse: Current or Past(Physical, Sexual or Emotional)- No  Do you feel safe in your environment - Yes    Social History   Substance Use Topics     Smoking status: Never Smoker     Smokeless tobacco: Current User     Types: Chew      Comment: current some day use of chewing tobacco     Alcohol use Yes      Comment: occasional/social      If you drink alcohol do you typically have >3 drinks per day or >7 drinks per week? No                      Last PSA: No results found for: PSA    Reviewed orders with patient. Reviewed health maintenance and updated orders accordingly - Yes  Labs reviewed in EPIC    Reviewed and updated as needed this visit by clinical staff  Tobacco  Allergies  Meds  Problems  Med Hx  Surg Hx  Fam Hx  Soc Hx          Reviewed and updated as needed this visit by Provider  Allergies  Meds  Problems  Med Hx  Surg Hx  Fam Hx            ROS:  CONSTITUTIONAL: NEGATIVE for fever, chills, change in weight  INTEGUMENTARY/SKIN: NEGATIVE for worrisome rashes, moles or lesions  EYES: NEGATIVE for vision changes or irritation  ENT: NEGATIVE for ear, mouth and throat problems  RESP: NEGATIVE for significant cough or SOB--see HPI  CV: NEGATIVE for chest pain, palpitations or peripheral edema  GI: NEGATIVE for nausea, abdominal pain, heartburn, or change in bowel habits   male: negative for dysuria, hematuria, decreased urinary stream, erectile dysfunction, urethral discharge  MUSCULOSKELETAL: NEGATIVE for significant arthralgias or myalgia  NEURO: NEGATIVE for weakness, dizziness or paresthesias  PSYCHIATRIC: NEGATIVE for changes in mood or affect    OBJECTIVE:   /86 (BP Location: Right arm, Patient Position: Sitting, Cuff Size: Adult Large)  Pulse 105  Temp 98.7  F (37.1  C) (Oral)  Resp 20  Ht 1.829 m (6')  Wt 116.3 kg (256 lb 6.4 oz)  SpO2 95%  BMI 34.77 kg/m2  EXAM:  GENERAL: healthy, alert and no distress  EYES: Eyes grossly normal to inspection, PERRL and  conjunctivae and sclerae normal  HENT: ear canals and TM's normal, nose and mouth without ulcers or lesions  NECK: no adenopathy, no asymmetry, masses, or scars and thyroid normal to palpation  RESP: lungs clear to auscultation - no rales, rhonchi or wheezes  CV: regular rate and rhythm, normal S1 S2, no S3 or S4, no murmur, click or rub  ABDOMEN: soft, obese, nontender, no hepatosplenomegaly, no masses and bowel sounds normal  MS: no gross musculoskeletal defects noted, no edema  SKIN: no suspicious lesions or rashes  NEURO: Normal strength and tone, mentation intact and speech normal  PSYCH: mentation appears normal, affect normal/bright    ASSESSMENT/PLAN:   1. Encounter for routine adult health examination without abnormal findings  Stable exam    2. Hypertension goal BP (blood pressure) < 140/90  Stop lisinopril. Start losartan.   - Comprehensive metabolic panel (BMP + Alb, Alk Phos, ALT, AST, Total. Bili, TP); Future  - losartan (COZAAR) 25 MG tablet; Take 0.5 tablets (12.5 mg) by mouth daily  Dispense: 30 tablet; Refill: 0    3. Elevated liver enzymes  screen  - Comprehensive metabolic panel (BMP + Alb, Alk Phos, ALT, AST, Total. Bili, TP); Future    4. Screening for cholesterol level  screen  - Lipid panel reflex to direct LDL Fasting; Future    5. Anxiety  Start medication. Return in 1 month  - sertraline (ZOLOFT) 25 MG tablet; Take 1 tablet (25 mg) by mouth daily  Dispense: 60 tablet; Refill: 0  - LORazepam (ATIVAN) 0.5 MG tablet; Take 1 tablet (0.5 mg) by mouth daily as needed for anxiety For panic attacks  Dispense: 10 tablet; Refill: 0    6. Family history of diabetes mellitus  screen  - Hemoglobin A1c; Future    7. Snores  If on-going fatigue or SOB, may want to consider sleep study    8. Obesity  Work on diet and exercise     COUNSELING:  Reviewed preventive health counseling, as reflected in patient instructions       reports that he has never smoked. His smokeless tobacco use includes  Keira.    Estimated body mass index is 34.77 kg/(m^2) as calculated from the following:    Height as of this encounter: 1.829 m (6').    Weight as of this encounter: 116.3 kg (256 lb 6.4 oz).   Weight management plan: Discussed healthy diet and exercise guidelines and patient will follow up in 12 months in clinic to re-evaluate.    Counseling Resources:  ATP IV Guidelines  Pooled Cohorts Equation Calculator  FRAX Risk Assessment  ICSI Preventive Guidelines  Dietary Guidelines for Americans, 2010  USDA's MyPlate  ASA Prophylaxis  Lung CA Screening    Return in 1 month for follow up on anxiety and BP    RODRIGO Peguero, NP-C  [facility]

## 2018-05-16 NOTE — MR AVS SNAPSHOT
After Visit Summary   5/16/2018    Derrick Espinosa    MRN: [id number]           Patient Information     Date Of Birth          1970        Visit Information        Provider Department      5/16/2018 10:40 AM Rosemary Dowd NP [facility]        Today's Diagnoses     Encounter for routine adult health examination without abnormal findings    -  1    Hypertension goal BP (blood pressure) < 140/90        Elevated liver enzymes        Screening for cholesterol level        Anxiety        Family history of diabetes mellitus          Care Instructions    Return in 4-8 weeks, sooner if concerns.       Preventive Health Recommendations  Male Ages 40 to 49    Yearly exam:             See your health care provider every year in order to  o   Review health changes.   o   Discuss preventive care.    o   Review your medicines if your doctor has prescribed any.    You should be tested each year for STDs (sexually transmitted diseases) if you re at risk.     Have a cholesterol test every 5 years.     Have a colonoscopy (test for colon cancer) if someone in your family has had colon cancer or polyps before age 50.     After age 45, have a diabetes test (fasting glucose). If you are at risk for diabetes, you should have this test every 3 years.      Talk with your health care provider about whether or not a prostate cancer screening test (PSA) is right for you.    Shots: Get a flu shot each year. Get a tetanus shot every 10 years.     Nutrition:    Eat at least 5 servings of fruits and vegetables daily.     Eat whole-grain bread, whole-wheat pasta and brown rice instead of white grains and rice.     Talk to your provider about Calcium and Vitamin D.     Lifestyle    Exercise for at least 150 minutes a week (30 minutes a day, 5 days a week). This will help you control your weight and prevent disease.     Limit alcohol to one drink per day.     No smoking.     Wear sunscreen to prevent skin cancer.     See  your dentist every six months for an exam and cleaning.              Follow-ups after your visit        Your next 10 appointments already scheduled     May 21, 2018  8:40 AM CDT   [facility]    [address] [id number] [phone]           Please do not eat 10-12 hours before your appointment if you are coming in fasting for labs on lipids, cholesterol, or glucose (sugar). This does not apply to pregnant women. Water, hot tea and black coffee (with nothing added) are okay. Do not drink other fluids, diet soda or chew gum.              Future tests that were ordered for you today     Open Future Orders        Priority Expected Expires Ordered    Comprehensive metabolic panel (BMP + Alb, Alk Phos, ALT, AST, Total. Bili, TP) Routine  5/16/2019 5/16/2018    Hemoglobin A1c Routine  5/16/2019 5/16/2018    Lipid panel reflex to direct LDL Fasting Routine  5/16/2019 5/16/2018            Who to contact     If you have questions or need follow up information about today's clinic visit or your schedule please contact [facility] directly at [phone].  Normal or non-critical lab and imaging results will be communicated to you by MyChart, letter or phone within 4 business days after the clinic has received the results. If you do not hear from us within 7 days, please contact the clinic through [employer] or phone. If you have a critical or abnormal lab result, we will notify you by phone as soon as possible.  Submit refill requests through [employer] or call your pharmacy and they will forward the refill request to us. Please allow 3 business days for your refill to be completed.          Additional Information About Your Visit        [employer] A[employer] Information     [employer] gives you secure access to your electronic health record. If you see a primary care provider, you can also send messages to your care team and make  appointments. If you have questions, please call your primary care clinic.  If you do not have a primary care provider, please call [phone] and they will assist you.        Care EveryWhere ID     This is your Care EveryWhere ID. This could be used by other organizations to access your [place] medical records  [id number]        Your Vitals Were     Pulse Temperature Respirations Height Pulse Oximetry BMI (Body Mass Index)    105 98.7  F (37.1  C) (Oral) 20 1.829 m (6') 95% 34.77 kg/m2       Blood Pressure from Last 3 Encounters:   05/16/18 130/86   12/12/17 130/90   12/04/17 122/86    Weight from Last 3 Encounters:   05/16/18 116.3 kg (256 lb 6.4 oz)   12/12/17 118.4 kg (261 lb)   11/14/17 117.7 kg (259 lb 6.4 oz)                 Today's Medication Changes          These changes are accurate as of 5/16/18 11:00 AM.  If you have any questions, ask your nurse or doctor.               Start taking these medicines.        Dose/Directions    LORazepam 0.5 MG tablet   Commonly known as:  ATIVAN   Used for:  Anxiety   Started by:  Rosemary Dowd NP        Dose:  0.5 mg   Take 1 tablet (0.5 mg) by mouth daily as needed for anxiety For panic attacks   Quantity:  10 tablet   Refills:  0       losartan 25 MG tablet   Commonly known as:  COZAAR   Used for:  Hypertension goal BP (blood pressure) < 140/90   Started by:  Rosemary Dowd NP        Dose:  12.5 mg   Take 0.5 tablets (12.5 mg) by mouth daily   Quantity:  30 tablet   Refills:  0       sertraline 25 MG tablet   Commonly known as:  ZOLOFT   Used for:  Anxiety   Started by:  Rosemary Dowd NP        Dose:  25 mg   Take 1 tablet (25 mg) by mouth daily   Quantity:  60 tablet   Refills:  0         Stop taking these medicines if you haven't already. Please contact your care team if you have questions.     lisinopril 10 MG tablet   Commonly known as:  PRINIVIL/ZESTRIL   Stopped by:  Rosemary Dowd NP                Where to get your medicines      These medications were sent to  [facility] [address] [place], [place] [address] [address] [place] [address] [address] [address]     Phone:  [phone]     losartan 25 MG tablet    sertraline 25 MG tablet         Some of these will need a paper prescription and others can be bought over the counter.  Ask your nurse if you have questions.     Bring a paper prescription for each of these medications     LORazepam 0.5 MG tablet                Primary Care Provider Office Phone # Fax #    [facility] [phone] [address]  [facility] [address]        Equal Access to Services     GREGORIO BAHENA : Hadii lisbeth mercedeso Soshanda, waaxda luqadaha, qaybta kaalmada aderaulyada, xenia harris. So [facility] [phone].    ATENCIÓN: Si habla español, tiene a torrez disposición servicios gratuitos de asistencia lingüística. Llame al [phone].    We comply with applicable federal civil rights laws and [place] laws. We do not discriminate on the basis of race, color, national origin, age, disability, sex, sexual orientation, or gender identity.            Thank you!     Thank you for choosing [facility]  for your care. Our goal is always to provide you with excellent care. Hearing back from our patients is one way we can continue to improve our services. Please take a few minutes to complete the written survey that you may receive in the mail after your visit with us. Thank you!             Your Updated Medication List - Protect others around you: Learn how to safely use, store and throw away your medicines at www.disposemymeds.org.          This list is accurate as of 5/16/18 11:00 AM.  Always use your most recent med list.                   Brand Name Dispense Instructions for use Diagnosis    LORazepam 0.5 MG tablet    ATIVAN    10 tablet    Take 1 tablet (0.5 mg) by mouth daily as needed for anxiety For panic attacks    Anxiety        losartan 25 MG tablet    COZAAR    30 tablet    Take 0.5 tablets (12.5 mg) by mouth daily    Hypertension goal BP (blood pressure) < 140/90       sertraline 25 MG tablet    ZOLOFT    60 tablet    Take 1 tablet (25 mg) by mouth daily    Anxiety

## 2018-05-21 DIAGNOSIS — Z83.3 FAMILY HISTORY OF DIABETES MELLITUS: ICD-10-CM

## 2018-05-21 DIAGNOSIS — Z13.220 SCREENING FOR CHOLESTEROL LEVEL: ICD-10-CM

## 2018-05-21 DIAGNOSIS — I10 HYPERTENSION GOAL BP (BLOOD PRESSURE) < 140/90: ICD-10-CM

## 2018-05-21 DIAGNOSIS — R74.8 ELEVATED LIVER ENZYMES: ICD-10-CM

## 2018-05-21 LAB
ALBUMIN SERPL-MCNC: 4.1 G/DL (ref 3.4–5)
ALP SERPL-CCNC: 90 U/L (ref 40–150)
ALT SERPL W P-5'-P-CCNC: 146 U/L (ref 0–70)
ANION GAP SERPL CALCULATED.3IONS-SCNC: 9 MMOL/L (ref 3–14)
AST SERPL W P-5'-P-CCNC: 119 U/L (ref 0–45)
BILIRUB SERPL-MCNC: 1 MG/DL (ref 0.2–1.3)
BUN SERPL-MCNC: 9 MG/DL (ref 7–30)
CALCIUM SERPL-MCNC: 8.9 MG/DL (ref 8.5–10.1)
CHLORIDE SERPL-SCNC: 106 MMOL/L (ref 94–109)
CHOLEST SERPL-MCNC: 240 MG/DL
CO2 SERPL-SCNC: 26 MMOL/L (ref 20–32)
CREAT SERPL-MCNC: 0.96 MG/DL (ref 0.66–1.25)
GFR SERPL CREATININE-BSD FRML MDRD: 84 ML/MIN/1.7M2
GLUCOSE SERPL-MCNC: 89 MG/DL (ref 70–99)
HBA1C MFR BLD: 5.4 % (ref 0–5.6)
HDLC SERPL-MCNC: 70 MG/DL
LDLC SERPL CALC-MCNC: 145 MG/DL
NONHDLC SERPL-MCNC: 170 MG/DL
POTASSIUM SERPL-SCNC: 3.6 MMOL/L (ref 3.4–5.3)
PROT SERPL-MCNC: 7.9 G/DL (ref 6.8–8.8)
SODIUM SERPL-SCNC: 141 MMOL/L (ref 133–144)
TRIGL SERPL-MCNC: 126 MG/DL

## 2018-05-21 PROCEDURE — 80053 COMPREHEN METABOLIC PANEL: CPT | Performed by: NURSE PRACTITIONER

## 2018-05-21 PROCEDURE — 36415 COLL VENOUS BLD VENIPUNCTURE: CPT | Performed by: NURSE PRACTITIONER

## 2018-05-21 PROCEDURE — 83036 HEMOGLOBIN GLYCOSYLATED A1C: CPT | Performed by: NURSE PRACTITIONER

## 2018-05-21 PROCEDURE — 80061 LIPID PANEL: CPT | Performed by: NURSE PRACTITIONER

## 2018-06-01 ENCOUNTER — TELEPHONE (OUTPATIENT)
Dept: FAMILY MEDICINE | Facility: CLINIC | Age: 48
End: 2018-06-01

## 2018-06-01 NOTE — TELEPHONE ENCOUNTER
Panel Management Review      BP Readings from Last 1 Encounters:   05/16/18 130/86      Last Office Visit with this department: 5/16/2018          Patient is due/failing the following:   BP CHECK    Action needed:   Patient needs office visit for hypertension with provider.    Type of outreach:    Sent [employer] message.    Johann Cali, Medical Assistant

## 2018-07-18 DIAGNOSIS — I10 HYPERTENSION GOAL BP (BLOOD PRESSURE) < 140/90: ICD-10-CM

## 2018-07-18 DIAGNOSIS — F41.9 ANXIETY: ICD-10-CM

## 2018-07-18 NOTE — TELEPHONE ENCOUNTER
"Requested Prescriptions   Pending Prescriptions Disp Refills     sertraline (ZOLOFT) 25 MG tablet 60 tablet 0     Sig: Take 1 tablet (25 mg) by mouth daily    SSRIs Protocol Failed    7/18/2018  8:20 AM       Failed - Recent (12 mo) or future (30 days) visit within the authorizing provider's specialty    Patient had office visit in the last 12 months or has a visit in the next 30 days with authorizing provider or within the authorizing provider's specialty.  See \"Patient Info\" tab in inbasket, or \"Choose Columns\" in Meds & Orders section of the refill encounter.           Passed - Patient is age 18 or older        sertraline (ZOLOFT) 25 MG tablet  Last Written Prescription Date:  5/16/18  Last Fill Quantity: 60,  # refills: 0   Last office visit: 5/16/2018 with prescribing provider:  Rosemary Dowd   Future Office Visit:      PHQ-9 SCORE 2/12/2016 12/12/2017   Total Score 14 0     OLIVER-7 SCORE 2/12/2016 12/12/2017   Total Score 8 0         "

## 2018-07-18 NOTE — TELEPHONE ENCOUNTER
"Requested Prescriptions   Pending Prescriptions Disp Refills     losartan (COZAAR) 25 MG tablet 30 tablet 0     Sig: Take 0.5 tablets (12.5 mg) by mouth daily    Angiotensin-II Receptors Failed    7/18/2018  8:18 AM       Failed - Recent (12 mo) or future (30 days) visit within the authorizing provider's specialty    Patient had office visit in the last 12 months or has a visit in the next 30 days with authorizing provider or within the authorizing provider's specialty.  See \"Patient Info\" tab in inbasket, or \"Choose Columns\" in Meds & Orders section of the refill encounter.           Passed - Blood pressure under 140/90 in past 12 months    BP Readings from Last 3 Encounters:   05/16/18 130/86   12/12/17 130/90   12/04/17 122/86                Passed - Patient is age 18 or older       Passed - Normal serum creatinine on file in past 12 months    Recent Labs   Lab Test  05/21/18   0845   CR  0.96            Passed - Normal serum potassium on file in past 12 months    Recent Labs   Lab Test  05/21/18   0845   POTASSIUM  3.6                    losartan (COZAAR) 25 MG tablet  Last Written Prescription Date:  5/16/18  Last Fill Quantity: 30,  # refills: 0   Last office visit: 5/16/2018 with prescribing provider:  Rosemary Dowd   Future Office Visit:      "

## 2018-07-23 RX ORDER — LOSARTAN POTASSIUM 25 MG/1
12.5 TABLET ORAL DAILY
Qty: 30 TABLET | Refills: 1 | Status: SHIPPED | OUTPATIENT
Start: 2018-07-23

## 2018-07-23 NOTE — TELEPHONE ENCOUNTER
PHQ-9 SCORE 2/12/2016 12/12/2017   Total Score 14 0     Routing refill request to provider for review/approval because:  Patient needs to be seen because:  Was to follow up 1 month after starting medication and did not follow up.    Corazon Jay RN, BSN

## 2018-07-23 NOTE — TELEPHONE ENCOUNTER
Prescription approved per [facility] Refill Protocol.    Corazon Jay RN, BSN

## 2018-07-24 RX ORDER — SERTRALINE HYDROCHLORIDE 25 MG/1
25 TABLET, FILM COATED ORAL DAILY
Qty: 15 TABLET | Refills: 0 | Status: SHIPPED | OUTPATIENT
Start: 2018-07-24 | End: 2018-08-09

## 2018-08-09 ENCOUNTER — TELEPHONE (OUTPATIENT)
Dept: FAMILY MEDICINE | Facility: CLINIC | Age: 48
End: 2018-08-09

## 2018-08-09 DIAGNOSIS — F41.9 ANXIETY: ICD-10-CM

## 2018-08-09 NOTE — TELEPHONE ENCOUNTER
Routing refill request to provider for review/approval because:  Opal given x1 and patient did not follow up, please advise  Drug not on the [facility] refill protocol     Corazon Jay RN, BSN

## 2018-08-09 NOTE — TELEPHONE ENCOUNTER
Requested Prescriptions   Pending Prescriptions Disp Refills     LORazepam (ATIVAN) 0.5 MG tablet 10 tablet 0     Sig: Take 1 tablet (0.5 mg) by mouth daily as needed for anxiety For panic attacks    There is no refill protocol information for this order        LORazepam (ATIVAN) 0.5 MG tablet      Last Written Prescription Date:  5/16/18  Last Fill Quantity: 10,   # refills: 0  Last Office Visit: 5/16/18  Future Office visit:       Routing refill request to provider for review/approval because:  Drug not on the [facility] refill protocol or controlled substance

## 2018-08-09 NOTE — TELEPHONE ENCOUNTER
Routing refill request to provider for review/approval because:  Opal given x1 and patient did not follow up, please advise    Corazon Jay RN, BSN

## 2018-08-09 NOTE — TELEPHONE ENCOUNTER
"Requested Prescriptions   Pending Prescriptions Disp Refills     sertraline (ZOLOFT) 25 MG tablet 15 tablet 0     Sig: Take 1 tablet (25 mg) by mouth daily    SSRIs Protocol Failed    8/9/2018  8:09 AM       Failed - Recent (12 mo) or future (30 days) visit within the authorizing provider's specialty    Patient had office visit in the last 12 months or has a visit in the next 30 days with authorizing provider or within the authorizing provider's specialty.  See \"Patient Info\" tab in inbasket, or \"Choose Columns\" in Meds & Orders section of the refill encounter.           Passed - Patient is age 18 or older        sertraline (ZOLOFT) 25 MG tablet  Last Written Prescription Date:  7/24/18  Last Fill Quantity: 15,  # refills: 0   Last office visit: 5/16/2018 with prescribing provider:  Rosemary Dowd   Future Office Visit:      PHQ-9 SCORE 2/12/2016 12/12/2017   Total Score 14 0     OLIVER-7 SCORE 2/12/2016 12/12/2017   Total Score 8 0         "

## 2018-08-10 RX ORDER — LORAZEPAM 0.5 MG/1
0.5 TABLET ORAL DAILY PRN
Qty: 10 TABLET | Refills: 0 | OUTPATIENT
Start: 2018-08-10

## 2018-08-10 RX ORDER — SERTRALINE HYDROCHLORIDE 25 MG/1
25 TABLET, FILM COATED ORAL DAILY
Qty: 15 TABLET | Refills: 0 | Status: SHIPPED | OUTPATIENT
Start: 2018-08-10 | End: 2018-08-27

## 2018-08-10 NOTE — TELEPHONE ENCOUNTER
Refused prescription-he did not make an appointment last time after filling the prescription.  Requires office visit.  NP has some availability next Tuesday/Wednesday.  If he makes an appointment then I can fill until next week.  RODRIGO Peguero, NP-C

## 2018-08-13 NOTE — TELEPHONE ENCOUNTER
This writer attempted to contact 1 on 08/13/18      Reason for call appointment and left detailed message.      If patient calls back: PLEASE SCHEDULE PATIENT FOR OFFICE VISIT          ADRI, MEDICAL ASSISTANT

## 2018-08-13 NOTE — TELEPHONE ENCOUNTER
This writer attempted to contact 1 on 08/13/18      Reason for call appointment and left detailed message.      If patient calls back: PLEASE SCHEDULE PATIENT FOR OFFICE VISIT. REFILL REQUEST DECLINED.           Refused prescription-he did not make an appointment last time after filling the prescription.  Requires office visit.  NP has some availability next Tuesday/Wednesday.  If he makes an appointment then I can fill until next week.  RODRIGO Peguero, NP-C                  [id number], MEDICAL ASSISTANT

## 2018-08-27 RX ORDER — SERTRALINE HYDROCHLORIDE 25 MG/1
25 TABLET, FILM COATED ORAL DAILY
Qty: 10 TABLET | Refills: 0 | Status: SHIPPED | OUTPATIENT
Start: 2018-08-27 | End: 2018-09-10

## 2018-08-27 NOTE — TELEPHONE ENCOUNTER
This writer attempted to contact pt on 08/27/18      Reason for call medication and left detailed message.      If patient calls back:   Relay message below per grady, (read verbatim), document that pt called and close encounter        Bianka Jorgensen CMA

## 2018-08-27 NOTE — TELEPHONE ENCOUNTER
..Reason for Call:   Patient's girlfriend called in to leave information    Detailed comments: Patient now resides in [place]; he is asking for at least 3 days worth of the sertraline until seen by a new physician in [place]/    Patient has an appointment on Wed 8-;     *message in chart could not be relayed to caller/no RIANA in place.     Phone Number Patient can be reached at: Home number on file [phone] (home)    Best Time: after 6:30 pm    Can we leave a detailed message on this number? YES    Call taken on 8/27/2018 at 1:22 PM by Madyson Tovar

## 2018-08-27 NOTE — TELEPHONE ENCOUNTER
Sent in 10 day refill. Last visit in May 2018 he also had uncontrolled hypertension. He needs to address his mood and hypertension with his new provider.   RODRIGO Peguero, NP-C

## 2018-09-04 DIAGNOSIS — F41.9 ANXIETY: ICD-10-CM

## 2018-09-04 NOTE — TELEPHONE ENCOUNTER
"Requested Prescriptions   Pending Prescriptions Disp Refills     sertraline (ZOLOFT) 25 MG tablet 10 tablet 0     Sig: Take 1 tablet (25 mg) by mouth daily    SSRIs Protocol Failed    9/4/2018  9:21 AM       Failed - Recent (12 mo) or future (30 days) visit within the authorizing provider's specialty    Patient had office visit in the last 12 months or has a visit in the next 30 days with authorizing provider or within the authorizing provider's specialty.  See \"Patient Info\" tab in inbasket, or \"Choose Columns\" in Meds & Orders section of the refill encounter.           Passed - Patient is age 18 or older      sertraline (ZOLOFT) 25 MG tablet  Last Written Prescription Date:  8/27/18  Last Fill Quantity: 10,  # refills: 0   Last office visit: 5/16/2018 with prescribing provider:  Rosemary Dowd   Future Office Visit:      PHQ-9 SCORE 2/12/2016 12/12/2017   Total Score 14 0     OLIVER-7 SCORE 2/12/2016 12/12/2017   Total Score 8 0         "

## 2018-09-06 NOTE — TELEPHONE ENCOUNTER
Routing refill request to provider for review/approval because:  Was given 10 day supply from provider last time and asked to follow up with new provider for further refills-see 8/9/18 telephone encounter.  Padmaja Marcus RN

## 2018-09-07 RX ORDER — SERTRALINE HYDROCHLORIDE 25 MG/1
25 TABLET, FILM COATED ORAL DAILY
Qty: 10 TABLET | Refills: 0 | OUTPATIENT
Start: 2018-09-07

## 2018-09-07 NOTE — TELEPHONE ENCOUNTER
..Reason for Call:   prescription    Detailed comments: Patient called said he need to get refills until he can get into a clinic in [place], he said he is trying to find a clinic there to establish with he just moved.    Phone Number Patient can be reached at: Home number on file [phone] (home)    Best Time: anytime    Can we leave a detailed message on this number? YES    Call taken on 9/7/2018 at 12:21 PM by Khoi Camarena

## 2018-09-07 NOTE — TELEPHONE ENCOUNTER
This writer attempted to contact pt on 09/07/18      Reason for call schedule appt and left message.      If patient calls back:   Schedule Office Visit appointment within 1 week with primary care, document that pt called and close encounter         Yuli Ferrera MA

## 2018-09-07 NOTE — TELEPHONE ENCOUNTER
Needs appointment. Refusing refill. Also needs to follow up with provider on HTN as it was quite elevated back in May.  RODRIGO Peguero, NP-C

## 2018-09-10 RX ORDER — SERTRALINE HYDROCHLORIDE 25 MG/1
25 TABLET, FILM COATED ORAL DAILY
Qty: 20 TABLET | Refills: 0 | Status: SHIPPED | OUTPATIENT
Start: 2018-09-10

## 2018-12-12 PROBLEM — F41.9 ANXIETY: Status: ACTIVE | Noted: 2018-12-12

## 2020-03-02 ENCOUNTER — HEALTH MAINTENANCE LETTER (OUTPATIENT)
Age: 50
End: 2020-03-02

## 2020-12-14 ENCOUNTER — HEALTH MAINTENANCE LETTER (OUTPATIENT)
Age: 50
End: 2020-12-14

## 2021-04-18 ENCOUNTER — HEALTH MAINTENANCE LETTER (OUTPATIENT)
Age: 51
End: 2021-04-18

## 2021-10-02 ENCOUNTER — HEALTH MAINTENANCE LETTER (OUTPATIENT)
Age: 51
End: 2021-10-02

## 2022-05-14 ENCOUNTER — HEALTH MAINTENANCE LETTER (OUTPATIENT)
Age: 52
End: 2022-05-14

## 2022-09-03 ENCOUNTER — HEALTH MAINTENANCE LETTER (OUTPATIENT)
Age: 52
End: 2022-09-03

## 2023-02-02 NOTE — TELEPHONE ENCOUNTER
No change in her exam.   Patient NEEDS to follow up with any provider in [place]. He needs to make this a priority as his mood and HTN need to be addressed.   RODRIGO Peguero, NP-C

## 2023-06-03 ENCOUNTER — HEALTH MAINTENANCE LETTER (OUTPATIENT)
Age: 53
End: 2023-06-03